# Patient Record
Sex: FEMALE | Race: OTHER | HISPANIC OR LATINO | URBAN - METROPOLITAN AREA
[De-identification: names, ages, dates, MRNs, and addresses within clinical notes are randomized per-mention and may not be internally consistent; named-entity substitution may affect disease eponyms.]

---

## 2019-06-05 ENCOUNTER — INPATIENT (INPATIENT)
Facility: HOSPITAL | Age: 54
LOS: 5 days | Discharge: HOME | DRG: 872 | End: 2019-06-11
Attending: INTERNAL MEDICINE | Admitting: INTERNAL MEDICINE
Payer: SELF-PAY

## 2019-06-05 VITALS
RESPIRATION RATE: 24 BRPM | TEMPERATURE: 98 F | HEART RATE: 86 BPM | OXYGEN SATURATION: 100 % | WEIGHT: 184.97 LBS | HEIGHT: 60 IN

## 2019-06-05 DIAGNOSIS — Z98.890 OTHER SPECIFIED POSTPROCEDURAL STATES: Chronic | ICD-10-CM

## 2019-06-05 DIAGNOSIS — R11.10 VOMITING, UNSPECIFIED: ICD-10-CM

## 2019-06-05 DIAGNOSIS — K52.9 NONINFECTIVE GASTROENTERITIS AND COLITIS, UNSPECIFIED: ICD-10-CM

## 2019-06-05 DIAGNOSIS — E87.2 ACIDOSIS: ICD-10-CM

## 2019-06-05 DIAGNOSIS — Z29.9 ENCOUNTER FOR PROPHYLACTIC MEASURES, UNSPECIFIED: ICD-10-CM

## 2019-06-05 LAB
ALBUMIN SERPL ELPH-MCNC: 3.8 G/DL — SIGNIFICANT CHANGE UP (ref 3.5–5)
ALP SERPL-CCNC: 121 U/L — HIGH (ref 40–120)
ALT FLD-CCNC: 27 U/L DA — SIGNIFICANT CHANGE UP (ref 10–60)
ANION GAP SERPL CALC-SCNC: 8 MMOL/L — SIGNIFICANT CHANGE UP (ref 5–17)
APPEARANCE UR: CLEAR — SIGNIFICANT CHANGE UP
APTT BLD: 31.7 SEC — SIGNIFICANT CHANGE UP (ref 27.5–36.3)
AST SERPL-CCNC: 21 U/L — SIGNIFICANT CHANGE UP (ref 10–40)
BASOPHILS # BLD AUTO: 0.06 K/UL — SIGNIFICANT CHANGE UP (ref 0–0.2)
BASOPHILS NFR BLD AUTO: 0.4 % — SIGNIFICANT CHANGE UP (ref 0–2)
BILIRUB SERPL-MCNC: 0.4 MG/DL — SIGNIFICANT CHANGE UP (ref 0.2–1.2)
BILIRUB UR-MCNC: NEGATIVE — SIGNIFICANT CHANGE UP
BUN SERPL-MCNC: 11 MG/DL — SIGNIFICANT CHANGE UP (ref 7–18)
CALCIUM SERPL-MCNC: 9.1 MG/DL — SIGNIFICANT CHANGE UP (ref 8.4–10.5)
CHLORIDE SERPL-SCNC: 108 MMOL/L — SIGNIFICANT CHANGE UP (ref 96–108)
CO2 SERPL-SCNC: 25 MMOL/L — SIGNIFICANT CHANGE UP (ref 22–31)
COLOR SPEC: YELLOW — SIGNIFICANT CHANGE UP
CREAT SERPL-MCNC: 0.66 MG/DL — SIGNIFICANT CHANGE UP (ref 0.5–1.3)
DIFF PNL FLD: NEGATIVE — SIGNIFICANT CHANGE UP
EOSINOPHIL # BLD AUTO: 0.01 K/UL — SIGNIFICANT CHANGE UP (ref 0–0.5)
EOSINOPHIL NFR BLD AUTO: 0.1 % — SIGNIFICANT CHANGE UP (ref 0–6)
GLUCOSE SERPL-MCNC: 133 MG/DL — HIGH (ref 70–99)
GLUCOSE UR QL: NEGATIVE — SIGNIFICANT CHANGE UP
HCG SERPL-ACNC: 8 MIU/ML — HIGH
HCT VFR BLD CALC: 40.7 % — SIGNIFICANT CHANGE UP (ref 34.5–45)
HGB BLD-MCNC: 13 G/DL — SIGNIFICANT CHANGE UP (ref 11.5–15.5)
IMM GRANULOCYTES NFR BLD AUTO: 0.5 % — SIGNIFICANT CHANGE UP (ref 0–1.5)
INR BLD: 1.01 RATIO — SIGNIFICANT CHANGE UP (ref 0.88–1.16)
KETONES UR-MCNC: ABNORMAL
LACTATE SERPL-SCNC: 2.1 MMOL/L — HIGH (ref 0.7–2)
LACTATE SERPL-SCNC: 2.3 MMOL/L — HIGH (ref 0.7–2)
LEUKOCYTE ESTERASE UR-ACNC: NEGATIVE — SIGNIFICANT CHANGE UP
LIDOCAIN IGE QN: 53 U/L — LOW (ref 73–393)
LYMPHOCYTES # BLD AUTO: 1.59 K/UL — SIGNIFICANT CHANGE UP (ref 1–3.3)
LYMPHOCYTES # BLD AUTO: 10.5 % — LOW (ref 13–44)
MCHC RBC-ENTMCNC: 28.8 PG — SIGNIFICANT CHANGE UP (ref 27–34)
MCHC RBC-ENTMCNC: 31.9 GM/DL — LOW (ref 32–36)
MCV RBC AUTO: 90.2 FL — SIGNIFICANT CHANGE UP (ref 80–100)
MONOCYTES # BLD AUTO: 0.38 K/UL — SIGNIFICANT CHANGE UP (ref 0–0.9)
MONOCYTES NFR BLD AUTO: 2.5 % — SIGNIFICANT CHANGE UP (ref 2–14)
NEUTROPHILS # BLD AUTO: 13.07 K/UL — HIGH (ref 1.8–7.4)
NEUTROPHILS NFR BLD AUTO: 86 % — HIGH (ref 43–77)
NITRITE UR-MCNC: NEGATIVE — SIGNIFICANT CHANGE UP
NRBC # BLD: 0 /100 WBCS — SIGNIFICANT CHANGE UP (ref 0–0)
PH UR: 8 — SIGNIFICANT CHANGE UP (ref 5–8)
PLATELET # BLD AUTO: 350 K/UL — SIGNIFICANT CHANGE UP (ref 150–400)
POTASSIUM SERPL-MCNC: 3.7 MMOL/L — SIGNIFICANT CHANGE UP (ref 3.5–5.3)
POTASSIUM SERPL-SCNC: 3.7 MMOL/L — SIGNIFICANT CHANGE UP (ref 3.5–5.3)
PROT SERPL-MCNC: 8.4 G/DL — HIGH (ref 6–8.3)
PROT UR-MCNC: NEGATIVE — SIGNIFICANT CHANGE UP
PROTHROM AB SERPL-ACNC: 11.2 SEC — SIGNIFICANT CHANGE UP (ref 10–12.9)
RBC # BLD: 4.51 M/UL — SIGNIFICANT CHANGE UP (ref 3.8–5.2)
RBC # FLD: 14.8 % — HIGH (ref 10.3–14.5)
SODIUM SERPL-SCNC: 141 MMOL/L — SIGNIFICANT CHANGE UP (ref 135–145)
SP GR SPEC: 1.01 — SIGNIFICANT CHANGE UP (ref 1.01–1.02)
TROPONIN I SERPL-MCNC: <0.015 NG/ML — SIGNIFICANT CHANGE UP (ref 0–0.04)
UROBILINOGEN FLD QL: NEGATIVE — SIGNIFICANT CHANGE UP
WBC # BLD: 15.19 K/UL — HIGH (ref 3.8–10.5)
WBC # FLD AUTO: 15.19 K/UL — HIGH (ref 3.8–10.5)

## 2019-06-05 PROCEDURE — 99285 EMERGENCY DEPT VISIT HI MDM: CPT

## 2019-06-05 PROCEDURE — 74177 CT ABD & PELVIS W/CONTRAST: CPT | Mod: 26

## 2019-06-05 RX ORDER — FAMOTIDINE 10 MG/ML
0 INJECTION INTRAVENOUS
Qty: 0 | Refills: 0 | DISCHARGE

## 2019-06-05 RX ORDER — METRONIDAZOLE 500 MG
500 TABLET ORAL ONCE
Refills: 0 | Status: COMPLETED | OUTPATIENT
Start: 2019-06-05 | End: 2019-06-05

## 2019-06-05 RX ORDER — CIPROFLOXACIN LACTATE 400MG/40ML
VIAL (ML) INTRAVENOUS
Refills: 0 | Status: DISCONTINUED | OUTPATIENT
Start: 2019-06-05 | End: 2019-06-11

## 2019-06-05 RX ORDER — OXYCODONE HYDROCHLORIDE 5 MG/1
1 TABLET ORAL
Qty: 0 | Refills: 0 | DISCHARGE

## 2019-06-05 RX ORDER — ENOXAPARIN SODIUM 100 MG/ML
40 INJECTION SUBCUTANEOUS DAILY
Refills: 0 | Status: DISCONTINUED | OUTPATIENT
Start: 2019-06-05 | End: 2019-06-11

## 2019-06-05 RX ORDER — CIPROFLOXACIN LACTATE 400MG/40ML
400 VIAL (ML) INTRAVENOUS ONCE
Refills: 0 | Status: COMPLETED | OUTPATIENT
Start: 2019-06-05 | End: 2019-06-05

## 2019-06-05 RX ORDER — GABAPENTIN 400 MG/1
2 CAPSULE ORAL
Qty: 0 | Refills: 0 | DISCHARGE

## 2019-06-05 RX ORDER — METRONIDAZOLE 500 MG
500 TABLET ORAL EVERY 8 HOURS
Refills: 0 | Status: DISCONTINUED | OUTPATIENT
Start: 2019-06-06 | End: 2019-06-11

## 2019-06-05 RX ORDER — KETOROLAC TROMETHAMINE 30 MG/ML
15 SYRINGE (ML) INJECTION ONCE
Refills: 0 | Status: DISCONTINUED | OUTPATIENT
Start: 2019-06-05 | End: 2019-06-06

## 2019-06-05 RX ORDER — METRONIDAZOLE 500 MG
TABLET ORAL
Refills: 0 | Status: DISCONTINUED | OUTPATIENT
Start: 2019-06-05 | End: 2019-06-11

## 2019-06-05 RX ORDER — PIPERACILLIN AND TAZOBACTAM 4; .5 G/20ML; G/20ML
3.38 INJECTION, POWDER, LYOPHILIZED, FOR SOLUTION INTRAVENOUS ONCE
Refills: 0 | Status: COMPLETED | OUTPATIENT
Start: 2019-06-05 | End: 2019-06-05

## 2019-06-05 RX ORDER — CIPROFLOXACIN LACTATE 400MG/40ML
400 VIAL (ML) INTRAVENOUS EVERY 12 HOURS
Refills: 0 | Status: DISCONTINUED | OUTPATIENT
Start: 2019-06-06 | End: 2019-06-11

## 2019-06-05 RX ORDER — SODIUM CHLORIDE 9 MG/ML
1000 INJECTION INTRAMUSCULAR; INTRAVENOUS; SUBCUTANEOUS ONCE
Refills: 0 | Status: COMPLETED | OUTPATIENT
Start: 2019-06-05 | End: 2019-06-05

## 2019-06-05 RX ORDER — MORPHINE SULFATE 50 MG/1
4 CAPSULE, EXTENDED RELEASE ORAL ONCE
Refills: 0 | Status: DISCONTINUED | OUTPATIENT
Start: 2019-06-05 | End: 2019-06-05

## 2019-06-05 RX ORDER — DULOXETINE HYDROCHLORIDE 30 MG/1
0 CAPSULE, DELAYED RELEASE ORAL
Qty: 0 | Refills: 0 | DISCHARGE

## 2019-06-05 RX ORDER — FAMOTIDINE 10 MG/ML
20 INJECTION INTRAVENOUS EVERY 12 HOURS
Refills: 0 | Status: DISCONTINUED | OUTPATIENT
Start: 2019-06-05 | End: 2019-06-10

## 2019-06-05 RX ORDER — KETOROLAC TROMETHAMINE 30 MG/ML
15 SYRINGE (ML) INJECTION ONCE
Refills: 0 | Status: DISCONTINUED | OUTPATIENT
Start: 2019-06-05 | End: 2019-06-05

## 2019-06-05 RX ORDER — SODIUM CHLORIDE 9 MG/ML
1000 INJECTION, SOLUTION INTRAVENOUS
Refills: 0 | Status: DISCONTINUED | OUTPATIENT
Start: 2019-06-05 | End: 2019-06-11

## 2019-06-05 RX ORDER — ONDANSETRON 8 MG/1
4 TABLET, FILM COATED ORAL ONCE
Refills: 0 | Status: COMPLETED | OUTPATIENT
Start: 2019-06-05 | End: 2019-06-05

## 2019-06-05 RX ORDER — ONDANSETRON 8 MG/1
4 TABLET, FILM COATED ORAL EVERY 6 HOURS
Refills: 0 | Status: DISCONTINUED | OUTPATIENT
Start: 2019-06-05 | End: 2019-06-11

## 2019-06-05 RX ADMIN — MORPHINE SULFATE 4 MILLIGRAM(S): 50 CAPSULE, EXTENDED RELEASE ORAL at 14:02

## 2019-06-05 RX ADMIN — Medication 200 MILLIGRAM(S): at 18:35

## 2019-06-05 RX ADMIN — Medication 100 MILLIGRAM(S): at 18:29

## 2019-06-05 RX ADMIN — ONDANSETRON 4 MILLIGRAM(S): 8 TABLET, FILM COATED ORAL at 13:24

## 2019-06-05 RX ADMIN — MORPHINE SULFATE 4 MILLIGRAM(S): 50 CAPSULE, EXTENDED RELEASE ORAL at 13:24

## 2019-06-05 RX ADMIN — MORPHINE SULFATE 4 MILLIGRAM(S): 50 CAPSULE, EXTENDED RELEASE ORAL at 19:50

## 2019-06-05 RX ADMIN — SODIUM CHLORIDE 100 MILLILITER(S): 9 INJECTION, SOLUTION INTRAVENOUS at 18:36

## 2019-06-05 RX ADMIN — MORPHINE SULFATE 4 MILLIGRAM(S): 50 CAPSULE, EXTENDED RELEASE ORAL at 16:00

## 2019-06-05 RX ADMIN — SODIUM CHLORIDE 1000 MILLILITER(S): 9 INJECTION INTRAMUSCULAR; INTRAVENOUS; SUBCUTANEOUS at 14:28

## 2019-06-05 RX ADMIN — PIPERACILLIN AND TAZOBACTAM 200 GRAM(S): 4; .5 INJECTION, POWDER, LYOPHILIZED, FOR SOLUTION INTRAVENOUS at 15:18

## 2019-06-05 RX ADMIN — SODIUM CHLORIDE 1000 MILLILITER(S): 9 INJECTION INTRAMUSCULAR; INTRAVENOUS; SUBCUTANEOUS at 13:24

## 2019-06-05 NOTE — H&P ADULT - HISTORY OF PRESENT ILLNESS
Ms. Glo Reilly is a 54 year old Moroccan-speaking female with no PMHx presenting to the ED with chief complaint of vomiting. She reports that this morning she experiencing numerous episodes of NBNB vomiting with nausea and one episode of diarrhea. She reports associated symptoms of diffuse abdominal pain, as well as subjective fever and chills. She denies chest pain, dizziness, or any other symptoms at this time. She denies any recent travel or sick contacts. She denies eating any unusual food lately. She has no other complaints at this time.    In the ED, patient received zosyn x1, 1L NS bolus and a total of 8mg of morphine. Ms. Glo Reilly is a 54 year old Japanese-speaking female with no PMHx presenting to the ED with chief complaint of vomiting. She reports that this morning she experiencing numerous episodes of NBNB vomiting with nausea and one episode of diarrhea. She reports associated symptoms of diffuse abdominal pain, as well as subjective fever and chills. She denies chest pain, dizziness, or any other symptoms at this time. She denies any recent travel or sick contacts. She denies eating any unusual food lately. She has no other complaints at this time.    In the ED, patient received zosyn x1, 1L NS bolus and a total of 8mg of morphine Ms. Glo Reilly is a 54 year old Anguillan-speaking female with no PMHx presenting to the ED with chief complaint of vomiting. She reports that this morning she experiencing numerous episodes of NBNB vomiting with nausea and one episode of diarrhea. She reports associated symptoms of diffuse abdominal pain, as well as subjective fever and chills. She denies chest pain, dizziness, or any other symptoms at this time. She denies any recent travel or sick contacts. She denies eating any unusual food lately. She has no other complaints at this time.    Patient is from home and generally independent, but now ambulates with walker s/p traumatic work related injury 1 month ago and subsequent spinal surgery.    In the ED, patient received zosyn x1, 1L NS bolus and a total of 8mg of morphine.

## 2019-06-05 NOTE — H&P ADULT - NSHPSOCIALHISTORY_GEN_ALL_CORE
Patient denies use of tobacco products or illicit drugs. She reports occasional social alcohol use, but denies any recent drinks. 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

## 2019-06-05 NOTE — H&P ADULT - ATTENDING COMMENTS
pt seen and evaluated  agree with note above  pt a/w sepsis 2/2 colitis  cipro/flagyl  f/u stool studies  gi and id consult

## 2019-06-05 NOTE — H&P ADULT - NSHPPHYSICALEXAM_GEN_ALL_CORE
Vital Signs Last 24 Hrs  T(C): 37.4 (05 Jun 2019 16:05), Max: 37.4 (05 Jun 2019 16:05)  T(F): 99.3 (05 Jun 2019 16:05), Max: 99.3 (05 Jun 2019 16:05)  HR: 67 (05 Jun 2019 16:05) (67 - 86)  BP: 126/73 (05 Jun 2019 16:05) (126/73 - 126/73)  BP(mean): --  RR: 24 (05 Jun 2019 16:05) (24 - 24)  SpO2: 100% (05 Jun 2019 16:05) (100% - 100%)

## 2019-06-05 NOTE — ED ADULT NURSE NOTE - OBJECTIVE STATEMENT
Pt brought in by wheelchair c/o abdominal pain N&V started this AM. Pt c/o subjective fever with chills.

## 2019-06-05 NOTE — H&P ADULT - PROBLEM SELECTOR PLAN 1
WBC count 15.19  CT abdomen suggestive of colitis  lipase wnl  c/w cipro  c/w flagyl  NPO for now  c/w IV fluids  zofran prn  pepcid 20mg iv bid while npo  GI consulted - Dr. Costello  ID consulted - Dr. Sewell WBC count 15.19  CT abdomen suggestive of colitis  sx most likely 2/2 to infectious colitis  lipase wnl  c/w cipro  c/w flagyl  NPO for now - diet to be advanced as tolerated  c/w IV fluids  zofran prn  pepcid 20mg iv bid while npo  GI consulted - Dr. Costello  ID consulted - Dr. Sewell

## 2019-06-05 NOTE — ED PROVIDER NOTE - PROGRESS NOTE DETAILS
Pt's serum beta hCG came back at 8--I discussed with Pt and she says that she is perimenopausal and absolutely does not suspect that she is pregnant.  She had not had menstrual period in 2 years, and then in March 2019 she had vaginal bleeding for only 2 days and then no menstrual period since then.  I do not suspect that she is pregnant and benefit of having CT A/P likely outweighs risk based on information known at this time, especially given that she has severe abdominal pain with elevated WBC and lactate.  Zosyn ordered empirically for suspected intra-abdominal infection. Mcgarry: s/o from dr saenz.  pt ct shows diffuse colitis, abd , unable to po.    mild wbc  admit to med for diffuse colitis.  continue with cipro/flagyl, rpt lactate ordered.  pt received zosyn in ed

## 2019-06-05 NOTE — H&P ADULT - ASSESSMENT
Patient is a 54 year old female admitted to medicine for intractable vomiting 2/2 to infectious colitis.

## 2019-06-05 NOTE — ED PROVIDER NOTE - CLINICAL SUMMARY MEDICAL DECISION MAKING FREE TEXT BOX
53 y/o F pt presents with abdominal pain, vomiting, diarrhea and fever. Will check labs, UA, CT abd/pelvis, provide medication for nausea and reassess.

## 2019-06-05 NOTE — ED PROVIDER NOTE - OBJECTIVE STATEMENT
55 y/o F pt with a PMHx of kidney stones and a PSHx of lumbar spinal surgery with anterior and posterior approach last month, presents to the ED with complaints of generalized abdominal pain, vomiting, diarrhea and subjective fever since this morning. Contrary to triage note, denies rash. Patient denies urinary symptoms or any other symptoms. Allergies: Aspirin

## 2019-06-06 DIAGNOSIS — K52.9 NONINFECTIVE GASTROENTERITIS AND COLITIS, UNSPECIFIED: ICD-10-CM

## 2019-06-06 LAB
24R-OH-CALCIDIOL SERPL-MCNC: 13.1 NG/ML — LOW (ref 30–80)
ALBUMIN SERPL ELPH-MCNC: 3.2 G/DL — LOW (ref 3.5–5)
ALP SERPL-CCNC: 94 U/L — SIGNIFICANT CHANGE UP (ref 40–120)
ALT FLD-CCNC: 23 U/L DA — SIGNIFICANT CHANGE UP (ref 10–60)
ANION GAP SERPL CALC-SCNC: 6 MMOL/L — SIGNIFICANT CHANGE UP (ref 5–17)
AST SERPL-CCNC: 17 U/L — SIGNIFICANT CHANGE UP (ref 10–40)
BASOPHILS # BLD AUTO: 0.04 K/UL — SIGNIFICANT CHANGE UP (ref 0–0.2)
BASOPHILS NFR BLD AUTO: 0.3 % — SIGNIFICANT CHANGE UP (ref 0–2)
BILIRUB SERPL-MCNC: 0.5 MG/DL — SIGNIFICANT CHANGE UP (ref 0.2–1.2)
BUN SERPL-MCNC: 10 MG/DL — SIGNIFICANT CHANGE UP (ref 7–18)
CALCIUM SERPL-MCNC: 8.2 MG/DL — LOW (ref 8.4–10.5)
CHLORIDE SERPL-SCNC: 112 MMOL/L — HIGH (ref 96–108)
CHOLEST SERPL-MCNC: 153 MG/DL — SIGNIFICANT CHANGE UP (ref 10–199)
CO2 SERPL-SCNC: 27 MMOL/L — SIGNIFICANT CHANGE UP (ref 22–31)
CREAT SERPL-MCNC: 0.69 MG/DL — SIGNIFICANT CHANGE UP (ref 0.5–1.3)
EOSINOPHIL # BLD AUTO: 0.1 K/UL — SIGNIFICANT CHANGE UP (ref 0–0.5)
EOSINOPHIL NFR BLD AUTO: 0.8 % — SIGNIFICANT CHANGE UP (ref 0–6)
FOLATE SERPL-MCNC: 8.1 NG/ML — SIGNIFICANT CHANGE UP
GLUCOSE SERPL-MCNC: 94 MG/DL — SIGNIFICANT CHANGE UP (ref 70–99)
HBA1C BLD-MCNC: 5.1 % — SIGNIFICANT CHANGE UP (ref 4–5.6)
HCT VFR BLD CALC: 37 % — SIGNIFICANT CHANGE UP (ref 34.5–45)
HDLC SERPL-MCNC: 52 MG/DL — SIGNIFICANT CHANGE UP
HGB BLD-MCNC: 12 G/DL — SIGNIFICANT CHANGE UP (ref 11.5–15.5)
IMM GRANULOCYTES NFR BLD AUTO: 0.4 % — SIGNIFICANT CHANGE UP (ref 0–1.5)
LACTATE SERPL-SCNC: 1.2 MMOL/L — SIGNIFICANT CHANGE UP (ref 0.7–2)
LIPID PNL WITH DIRECT LDL SERPL: 84 MG/DL — SIGNIFICANT CHANGE UP
LYMPHOCYTES # BLD AUTO: 2.94 K/UL — SIGNIFICANT CHANGE UP (ref 1–3.3)
LYMPHOCYTES # BLD AUTO: 22.8 % — SIGNIFICANT CHANGE UP (ref 13–44)
MAGNESIUM SERPL-MCNC: 1.9 MG/DL — SIGNIFICANT CHANGE UP (ref 1.6–2.6)
MCHC RBC-ENTMCNC: 28.6 PG — SIGNIFICANT CHANGE UP (ref 27–34)
MCHC RBC-ENTMCNC: 32.4 GM/DL — SIGNIFICANT CHANGE UP (ref 32–36)
MCV RBC AUTO: 88.3 FL — SIGNIFICANT CHANGE UP (ref 80–100)
MONOCYTES # BLD AUTO: 0.96 K/UL — HIGH (ref 0–0.9)
MONOCYTES NFR BLD AUTO: 7.4 % — SIGNIFICANT CHANGE UP (ref 2–14)
NEUTROPHILS # BLD AUTO: 8.82 K/UL — HIGH (ref 1.8–7.4)
NEUTROPHILS NFR BLD AUTO: 68.3 % — SIGNIFICANT CHANGE UP (ref 43–77)
NRBC # BLD: 0 /100 WBCS — SIGNIFICANT CHANGE UP (ref 0–0)
PHOSPHATE SERPL-MCNC: 2.7 MG/DL — SIGNIFICANT CHANGE UP (ref 2.5–4.5)
PLATELET # BLD AUTO: 317 K/UL — SIGNIFICANT CHANGE UP (ref 150–400)
POTASSIUM SERPL-MCNC: 3.1 MMOL/L — LOW (ref 3.5–5.3)
POTASSIUM SERPL-SCNC: 3.1 MMOL/L — LOW (ref 3.5–5.3)
PROT SERPL-MCNC: 7.1 G/DL — SIGNIFICANT CHANGE UP (ref 6–8.3)
RBC # BLD: 4.19 M/UL — SIGNIFICANT CHANGE UP (ref 3.8–5.2)
RBC # FLD: 14.7 % — HIGH (ref 10.3–14.5)
SODIUM SERPL-SCNC: 145 MMOL/L — SIGNIFICANT CHANGE UP (ref 135–145)
TOTAL CHOLESTEROL/HDL RATIO MEASUREMENT: 2.9 RATIO — LOW (ref 3.3–7.1)
TRIGL SERPL-MCNC: 87 MG/DL — SIGNIFICANT CHANGE UP (ref 10–149)
TSH SERPL-MCNC: 0.68 UU/ML — SIGNIFICANT CHANGE UP (ref 0.34–4.82)
VIT B12 SERPL-MCNC: 419 PG/ML — SIGNIFICANT CHANGE UP (ref 232–1245)
WBC # BLD: 12.91 K/UL — HIGH (ref 3.8–10.5)
WBC # FLD AUTO: 12.91 K/UL — HIGH (ref 3.8–10.5)

## 2019-06-06 RX ORDER — MORPHINE SULFATE 50 MG/1
4 CAPSULE, EXTENDED RELEASE ORAL ONCE
Refills: 0 | Status: DISCONTINUED | OUTPATIENT
Start: 2019-06-06 | End: 2019-06-06

## 2019-06-06 RX ORDER — MORPHINE SULFATE 50 MG/1
2 CAPSULE, EXTENDED RELEASE ORAL EVERY 6 HOURS
Refills: 0 | Status: DISCONTINUED | OUTPATIENT
Start: 2019-06-06 | End: 2019-06-11

## 2019-06-06 RX ORDER — POTASSIUM CHLORIDE 20 MEQ
40 PACKET (EA) ORAL ONCE
Refills: 0 | Status: COMPLETED | OUTPATIENT
Start: 2019-06-06 | End: 2019-06-06

## 2019-06-06 RX ORDER — ACETAMINOPHEN 500 MG
650 TABLET ORAL EVERY 6 HOURS
Refills: 0 | Status: DISCONTINUED | OUTPATIENT
Start: 2019-06-06 | End: 2019-06-11

## 2019-06-06 RX ADMIN — Medication 15 MILLIGRAM(S): at 00:09

## 2019-06-06 RX ADMIN — ENOXAPARIN SODIUM 40 MILLIGRAM(S): 100 INJECTION SUBCUTANEOUS at 11:59

## 2019-06-06 RX ADMIN — Medication 200 MILLIGRAM(S): at 05:53

## 2019-06-06 RX ADMIN — Medication 200 MILLIGRAM(S): at 17:39

## 2019-06-06 RX ADMIN — FAMOTIDINE 20 MILLIGRAM(S): 10 INJECTION INTRAVENOUS at 05:53

## 2019-06-06 RX ADMIN — SODIUM CHLORIDE 100 MILLILITER(S): 9 INJECTION, SOLUTION INTRAVENOUS at 13:19

## 2019-06-06 RX ADMIN — Medication 100 MILLIGRAM(S): at 05:53

## 2019-06-06 RX ADMIN — MORPHINE SULFATE 2 MILLIGRAM(S): 50 CAPSULE, EXTENDED RELEASE ORAL at 22:01

## 2019-06-06 RX ADMIN — FAMOTIDINE 20 MILLIGRAM(S): 10 INJECTION INTRAVENOUS at 18:37

## 2019-06-06 RX ADMIN — MORPHINE SULFATE 2 MILLIGRAM(S): 50 CAPSULE, EXTENDED RELEASE ORAL at 15:50

## 2019-06-06 RX ADMIN — MORPHINE SULFATE 4 MILLIGRAM(S): 50 CAPSULE, EXTENDED RELEASE ORAL at 09:59

## 2019-06-06 RX ADMIN — Medication 100 MILLIGRAM(S): at 13:06

## 2019-06-06 RX ADMIN — MORPHINE SULFATE 2 MILLIGRAM(S): 50 CAPSULE, EXTENDED RELEASE ORAL at 21:31

## 2019-06-06 RX ADMIN — Medication 15 MILLIGRAM(S): at 00:39

## 2019-06-06 RX ADMIN — Medication 40 MILLIEQUIVALENT(S): at 13:05

## 2019-06-06 RX ADMIN — Medication 100 MILLIGRAM(S): at 21:32

## 2019-06-06 RX ADMIN — MORPHINE SULFATE 2 MILLIGRAM(S): 50 CAPSULE, EXTENDED RELEASE ORAL at 16:45

## 2019-06-06 RX ADMIN — MORPHINE SULFATE 4 MILLIGRAM(S): 50 CAPSULE, EXTENDED RELEASE ORAL at 10:37

## 2019-06-06 NOTE — PHYSICAL THERAPY INITIAL EVALUATION ADULT - ACTIVE RANGE OF MOTION EXAMINATION, REHAB EVAL
bilateral upper extremity Active ROM was WFL (within functional limits)/Left LE Active ROM was WFL (within functional limits)/decreased AROM of RLE secondary to accident and surgery

## 2019-06-06 NOTE — CONSULT NOTE ADULT - CONSTITUTIONAL DETAILS
well-nourished/well-developed/well-groomed/no distress
well-developed/well-groomed/no distress/well-nourished

## 2019-06-06 NOTE — PROGRESS NOTE ADULT - ASSESSMENT
Patient is a 54 year old female admitted to medicine for intractable vomiting 2/2 to infectious colitis.           Problem/Plan - 2:  ·  Problem: Lactic acidosis.  Plan: lactate 2.1 on admission --> increased to 2.3  c/w IV fluids  f/u lactate in AM.      Problem/Plan - 3:  ·  Problem: Need for prophylactic measure.  Plan: lovenox 40mg subq for dvt ppx.        Attending Statement:  pt seen and evaluated  agree with note above  pt a/w sepsis 2/2 colitis  cipro/flagyl  f/u stool studies  gi and id consult.    Pancolitis most likely infectious    Suggestions:    1. Check stool for cultures, O&P and C.diff toxin  2. Monitor electrolytes  3. Clear liquid diet  4. Avoid NSAID  5. Antibiotics IV  6. DVT prophylaxis Patient is a 54 year old female admitted to medicine for intractable vomiting 2/2 to infectious colitis.

## 2019-06-06 NOTE — CONSULT NOTE ADULT - NEGATIVE ENMT SYMPTOMS
no nose bleeds/no throat pain/no hearing difficulty/no ear pain/no dry mouth/no dysphagia/no gum bleeding/no vertigo

## 2019-06-06 NOTE — PHYSICAL THERAPY INITIAL EVALUATION ADULT - GENERAL OBSERVATIONS, REHAB EVAL
Pt received supine in bed, c/o pain in abdomen and feeling nauseous, however cooperative and agreeable to PT

## 2019-06-06 NOTE — CONSULT NOTE ADULT - ASSESSMENT
Colitis  Leukocytosis    plan - cont cipro 400mgs iv q12hrs  cont flagyl 500mgs iv q8hrs
1. Pancolitis most likely infectious    Suggestions:    1. Check stool for cultures, O&P and C.diff toxin  2. Monitor electrolytes  3. Clear liquid diet  4. Avoid NSAID  5. Antibiotics IV  6. DVT prophylaxis

## 2019-06-06 NOTE — CONSULT NOTE ADULT - NEGATIVE OPHTHALMOLOGIC SYMPTOMS
Soila Amezquita  [address] [facility] [id number]    1/6/2020      Dear  Soila Amezquita    In order to provide the highest quality care, MELO Rosario uses a sophisticated computer system to track our patien no photophobia/no diplopia

## 2019-06-06 NOTE — PHYSICAL THERAPY INITIAL EVALUATION ADULT - DIAGNOSIS, PT EVAL
Pt presents with weakness and impairments in functional mobility secondary to work accident from April 17th and subsequent surgeries. Pt is also experiencing abdominal pain and nausea which is affecting her mobility at this time.

## 2019-06-06 NOTE — CONSULT NOTE ADULT - GASTROINTESTINAL DETAILS
soft/no rebound tenderness/no rigidity/no masses palpable/no organomegaly/no guarding/bowel sounds normal/no distention
no rigidity/bowel sounds normal/nontender/no guarding/no rebound tenderness/soft/no distention

## 2019-06-06 NOTE — PHYSICAL THERAPY INITIAL EVALUATION ADULT - PHYSICAL ASSIST/NONPHYSICAL ASSIST: STAND/SIT, REHAB EVAL
"Initial /70   Pulse 73   Temp 97.7  F (36.5  C) (Tympanic)   Resp 16   Ht 1.626 m (5' 4\")   Wt 81 kg (178 lb 9.6 oz)   SpO2 100%   BMI 30.66 kg/m   Estimated body mass index is 30.66 kg/m  as calculated from the following:    Height as of this encounter: 1.626 m (5' 4\").    Weight as of this encounter: 81 kg (178 lb 9.6 oz). .    " 1 person assist

## 2019-06-06 NOTE — CONSULT NOTE ADULT - CONSTITUTIONAL
Bedside shift change report given to Irma Ortiz RN (oncoming nurse) by Jayden Fernandes RN (offgoing nurse). Report included the following information SBAR, Kardex, Intake/Output, MAR, Recent Results and Cardiac Rhythm NSR.
detailed exam
detailed exam

## 2019-06-06 NOTE — CONSULT NOTE ADULT - NEGATIVE NEUROLOGICAL SYMPTOMS
no confusion/no syncope/no headache/no tremors/no vertigo/no loss of consciousness/no loss of sensation/no difficulty walking

## 2019-06-06 NOTE — CONSULT NOTE ADULT - SUBJECTIVE AND OBJECTIVE BOX
[  ] STAT REQUEST              [ X ] ROUTINE REQUEST    Patient is a 54 year old female with abdominal pain and vomiting. GI consulted to evaluate.         HPI:  Ms. Glo Reilly is a 54 year old female presented wit diffuse abdominal pain, vomiting and diarrhea associated with fever and chills.  Patient denies hematemesis, hematochezia, melena, SOB, chest pain, palpitation, cough, hematuria, dysuria, recent traveling or antibiotic use.             PAIN MANAGEMENT:  Pain Scale:                7-8 /10  Pain Location:  Diffuse abdominal pain    Prior Colonoscopy:  No prior colonoscopy    PAST MEDICAL HISTORY  Kidney stones      PAST SURGICAL HISTORY  Spinal surgery      Allergies    aspirin (Rash)          MEDICATIONS  (STANDING):  ciprofloxacin   IVPB 400 milliGRAM(s) IV Intermittent every 12 hours  ciprofloxacin   IVPB      enoxaparin Injectable 40 milliGRAM(s) SubCutaneous daily  famotidine Injectable 20 milliGRAM(s) IV Push every 12 hours  lactated ringers. 1000 milliLiter(s) (100 mL/Hr) IV Continuous <Continuous>  metroNIDAZOLE  IVPB      metroNIDAZOLE  IVPB 500 milliGRAM(s) IV Intermittent every 8 hours    MEDICATIONS  (PRN):  morphine  - Injectable 2 milliGRAM(s) IV Push every 6 hours PRN Severe Pain (7 - 10)  ondansetron Injectable 4 milliGRAM(s) IV Push every 6 hours PRN Nausea and/or Vomiting      SOCIAL HISTORY  Advanced Directives:       [ X ] Full Code       [  ] DNR  Marital Status:         [ X ] M      [  ] S      [  ] D       [  ] W  Children:       [X  ] Yes      [  ] No  Occupation:        [  ] Employed       [ X ] Unemployed       [  ] Retired  Diet:       [ X ] Regular       [  ] PEG feeding          [  ] NG tube feeding  Drug Use:           [ X ] Patient denied          [  ] Yes  Alcohol:           [ X ] No             [  ] Yes (socially)         [  ] Yes (chronic)  Tobacco:           [  ] Yes           [ X ] No        FAMILY HISTORY  [ X ] Heart Disease            [  ] Diabetes             [  ] HTN             [  ] Colon Cancer             [  ] Stomach Cancer              [  ] Pancreatic Cancer        VITAL SIGNS   Vital Signs Last 24 Hrs  T(C): 37.1 (06 Jun 2019 14:12), Max: 37.4 (05 Jun 2019 16:05)  T(F): 98.8 (06 Jun 2019 14:12), Max: 99.3 (05 Jun 2019 16:05)  HR: 68 (06 Jun 2019 14:12) (67 - 91)  BP: 111/62 (06 Jun 2019 14:12) (108/56 - 137/96)  BP(mean): 69 (05 Jun 2019 19:00) (69 - 69)  RR: 16 (06 Jun 2019 14:12) (16 - 24)  SpO2: 100% (06 Jun 2019 14:12) (98% - 100%)          CBC Full  -  ( 06 Jun 2019 07:29 )  WBC Count : 12.91 K/uL  RBC Count : 4.19 M/uL  Hemoglobin : 12.0 g/dL  Hematocrit : 37.0 %  Platelet Count - Automated : 317 K/uL  Mean Cell Volume : 88.3 fl  Mean Cell Hemoglobin : 28.6 pg  Mean Cell Hemoglobin Concentration : 32.4 gm/dL  Auto Neutrophil # : 8.82 K/uL  Auto Lymphocyte # : 2.94 K/uL  Auto Monocyte # : 0.96 K/uL  Auto Eosinophil # : 0.10 K/uL  Auto Basophil # : 0.04 K/uL  Auto Neutrophil % : 68.3 %  Auto Lymphocyte % : 22.8 %  Auto Monocyte % : 7.4 %  Auto Eosinophil % : 0.8 %  Auto Basophil % : 0.3 %      06-06    145  |  112<H>  |  10  ----------------------------<  94  3.1<L>   |  27  |  0.69    Ca    8.2<L>      06 Jun 2019 07:29  Phos  2.7     06-06  Mg     1.9     06-06    TPro  7.1  /  Alb  3.2<L>  /  TBili  0.5  /  DBili  x   /  AST  17  /  ALT  23  /  AlkPhos  94  06-06     PT/INR - ( 05 Jun 2019 13:45 )   PT: 11.2 sec;   INR: 1.01 ratio     PTT - ( 05 Jun 2019 13:45 )  PTT:31.7 sec      Urinalysis (06.05.19 @ 13:45)    pH Urine: 8.0    Blood, Urine: Negative    Glucose Qualitative, Urine: Negative    Color: Yellow    Urine Appearance: Clear    Bilirubin: Negative    Ketone - Urine: Moderate    Specific Gravity: 1.010    Protein, Urine: Negative    Urobilinogen: Negative    Nitrite: Negative    Leukocyte Esterase Concentration: Negative    ECG  Ventricular Rate 77 BPM    Atrial Rate 77 BPM    P-R Interval 148 ms    QRS Duration 82 ms     ms    QTc 428 ms    P Axis 36 degrees    R Axis 43 degrees    T Axis 37 degrees    Diagnosis Line Normal sinus rhythm  Normal ECG    RADIOLOGY/IMAGING       EXAM:  CT ABDOMEN AND PELVIS IC                            PROCEDURE DATE:  06/05/2019          INTERPRETATION:  CLINICAL INDICATION: 54 years  Female with diffuse abd   pain, vomiting, fever, diarrhea. Lumbosacral spine surgery one month ago.    COMPARISON: None.    PROCEDURE:   CT of the Abdomen and Pelvis was performed with intravenous contrast.   Intravenous contrast: 90 ml Omnipaque 350. 10 ml discarded.  Oral contrast: None.  Sagittal and coronal reformats were performed.    LIMITATION: Lack of enteric contrast limits evaluation of the bowel.   Motion artifact.    FINDINGS:    LOWER CHEST: Bilateral lower lobe dependent atelectasis. Mildly enlarged   right hilar lymph node measuring up to 7 mm in short axis.    LIVER: The liver is lowin attenuation secondary to hepatic steatosis.   There is no focal mass or intrahepatic biliary duct distention. The liver   is enlarged with the right lobe measuring 22.5 cm sagittal.  BILE DUCTS: Normal caliber.  GALLBLADDER: Within normal limits.  SPLEEN: Within normal limits.  PANCREAS: Within normal limits.  ADRENALS: Within normal limits.  KIDNEYS/URETERS: 6 mm right midpole renal hypodensity too small to   characterize with certainty. No other renal mass. No hydronephrosis or   calculus. The kidneys enhance symmetrically. The ureters are not   distended.    BLADDER: Within normal limits.  REPRODUCTIVE ORGANS: The uterus contains a 3 mm calcified intramural   myoma. Otherwise unremarkable.    BOWEL: Diffuse colonic wall thickening. No pericolonic inflammatory   stranding. The appendix is normal.  PERITONEUM: No ascites.  VESSELS:  Within normal limits.  RETROPERITONEUM: No lymphadenopathy.    ABDOMINAL WALL: Within normal limits.  BONES: Status post L3, L4 and L5 laminectomies with bilateral pedicle   screws and intervening rods. Anterior L5-S1 fixation. Soft tissue is   present within the operative bed. Cannot evaluate the spinal canal and   exit foramina due to limitations of CT and adjacent metallic hardware   artifact.     IMPRESSION:     Diffuse colonic wall thickening consistent with under distention or   colitis.    Status post L3, L4 and L5 laminectomies with bilateral pedicle screws and   intervening rods. Anterior L5-S1 fixation. Soft tissue is present within   theoperative bed consistent with postsurgical change. Cannot rule out   superimposed infection. Cannot evaluate the spinal canal and exit   foramina due to limitations of CT and adjacent metallic hardware   artifact. Recommend clinical evaluation and follow-up.    Hepatic steatosis and hepatomegaly.    Renal hypodensity too small to characterize with certainty.    Mildly enlarged right hilar lymph node.

## 2019-06-06 NOTE — PHYSICAL THERAPY INITIAL EVALUATION ADULT - GAIT DEVIATIONS NOTED, PT EVAL
usestrailing limb position and lets trunk propel RLE forward d/t decreased strength and function of RLE

## 2019-06-06 NOTE — PHYSICAL THERAPY INITIAL EVALUATION ADULT - PERTINENT HX OF CURRENT PROBLEM, REHAB EVAL
Pt admitted with intractable vomitting, pt with recent traumatic work accident and surgery that affects her functional mobility

## 2019-06-06 NOTE — CONSULT NOTE ADULT - RS GEN PE MLT RESP DETAILS PC
no chest wall tenderness/clear to auscultation bilaterally/airway patent/respirations non-labored/breath sounds equal/good air movement

## 2019-06-06 NOTE — PHYSICAL THERAPY INITIAL EVALUATION ADULT - PATIENT PROFILE REVIEW, REHAB EVAL
yes/EMR, Laboratory and Radiology results reviewed
EMR, Laboratory and Radiology results reviewed/yes

## 2019-06-06 NOTE — PHYSICAL THERAPY INITIAL EVALUATION ADULT - ADDITIONAL COMMENTS
Pt independent with RW and modified AFO on RLE. Pts son lives with her and helps with all household chores and activities.

## 2019-06-06 NOTE — CONSULT NOTE ADULT - SUBJECTIVE AND OBJECTIVE BOX
HPI:  Ms. Glo Reilly is a 54 year old Bruneian-speaking female with no PMHx presenting to the ED with chief complaint of vomiting. She reports that this morning she experiencing numerous episodes of NBNB vomiting with nausea and one episode of diarrhea. She reports associated symptoms of diffuse abdominal pain, as well as subjective fever and chills. She denies chest pain, dizziness, or any other symptoms at this time. She denies any recent travel or sick contacts. She denies eating any unusual food lately. She has no other complaints at this time.  Patient is from home and generally independent, but now ambulates with walker s/p traumatic work related injury 1 month ago and subsequent spinal surgery.      PAST MEDICAL & SURGICAL HISTORY:  No pertinent past medical history  History of back surgery: s/p work related accident      aspirin (Rash)      Meds:  acetaminophen   Tablet .. 650 milliGRAM(s) Oral every 6 hours PRN  ciprofloxacin   IVPB 400 milliGRAM(s) IV Intermittent every 12 hours  ciprofloxacin   IVPB      enoxaparin Injectable 40 milliGRAM(s) SubCutaneous daily  famotidine Injectable 20 milliGRAM(s) IV Push every 12 hours  lactated ringers. 1000 milliLiter(s) IV Continuous <Continuous>  metroNIDAZOLE  IVPB      metroNIDAZOLE  IVPB 500 milliGRAM(s) IV Intermittent every 8 hours  morphine  - Injectable 2 milliGRAM(s) IV Push every 6 hours PRN  ondansetron Injectable 4 milliGRAM(s) IV Push every 6 hours PRN      SOCIAL HISTORY:  Smoker:   ETOH use:      FAMILY HISTORY:  No pertinent family history in first degree relatives      VITALS:  Vital Signs Last 24 Hrs  T(C): 37.1 (2019 14:12), Max: 37.2 (2019 21:29)  T(F): 98.8 (2019 14:12), Max: 99 (2019 21:29)  HR: 68 (2019 14:12) (68 - 91)  BP: 111/62 (2019 14:12) (108/56 - 137/96)  BP(mean): 69 (2019 19:00) (69 - 69)  RR: 16 (2019 14:12) (16 - 19)  SpO2: 100% (2019 14:12) (98% - 100%)    LABS/DIAGNOSTIC TESTS:                          12.0   12.91 )-----------( 317      ( 2019 07:29 )             37.0     WBC Count: 12.91 K/uL ( @ 07:29)  WBC Count: 15.19 K/uL ( @ 13:45)          145  |  112<H>  |  10  ----------------------------<  94  3.1<L>   |  27  |  0.69    Ca    8.2<L>      2019 07:29  Phos  2.7       Mg     1.9         TPro  7.1  /  Alb  3.2<L>  /  TBili  0.5  /  DBili  x   /  AST  17  /  ALT  23  /  AlkPhos  94        Urinalysis Basic - ( 2019 13:45 )    Color: Yellow / Appearance: Clear / S.010 / pH: x  Gluc: x / Ketone: Moderate  / Bili: Negative / Urobili: Negative   Blood: x / Protein: Negative / Nitrite: Negative   Leuk Esterase: Negative / RBC: x / WBC x   Sq Epi: x / Non Sq Epi: x / Bacteria: x        LIVER FUNCTIONS - ( 2019 07:29 )  Alb: 3.2 g/dL / Pro: 7.1 g/dL / ALK PHOS: 94 U/L / ALT: 23 U/L DA / AST: 17 U/L / GGT: x             PT/INR - ( 2019 13:45 )   PT: 11.2 sec;   INR: 1.01 ratio         PTT - ( 2019 13:45 )  PTT:31.7 sec    LACTATE:Lactate, Blood: 1.2 mmol/L ( @ 07:28)      ABG -     CULTURES:       RADIOLOGY:< from: CT Abdomen and Pelvis w/ IV Cont (19 @ 16:30) >  EXAM:  CT ABDOMEN AND PELVIS IC                            PROCEDURE DATE:  2019          INTERPRETATION:  CLINICAL INDICATION: 54 years  Female with diffuse abd   pain, vomiting, fever, diarrhea. Lumbosacral spine surgery one month ago.    COMPARISON: None.    PROCEDURE:   CT of the Abdomen and Pelvis was performed with intravenous contrast.   Intravenous contrast: 90 ml Omnipaque 350. 10 ml discarded.  Oral contrast: None.  Sagittal and coronal reformats were performed.    LIMITATION: Lack of enteric contrast limits evaluation of the bowel.   Motion artifact.    FINDINGS:    LOWER CHEST: Bilateral lower lobe dependent atelectasis. Mildly enlarged   right hilar lymph node measuring up to 7 mm in short axis.    LIVER: The liver is lowin attenuation secondary to hepatic steatosis.   There is no focal mass or intrahepatic biliary duct distention. The liver   is enlarged with the right lobe measuring 22.5 cm sagittal.  BILE DUCTS: Normal caliber.  GALLBLADDER: Within normal limits.  SPLEEN: Within normal limits.  PANCREAS: Within normal limits.  ADRENALS: Within normal limits.  KIDNEYS/URETERS: 6 mm right midpole renal hypodensity too small to   characterize with certainty. No other renal mass. No hydronephrosis or   calculus. The kidneys enhance symmetrically. The ureters are not   distended.    BLADDER: Within normal limits.  REPRODUCTIVE ORGANS: The uterus contains a 3 mm calcified intramural   myoma. Otherwise unremarkable.    BOWEL: Diffuse colonic wall thickening. No pericolonic inflammatory   stranding. The appendix is normal.  PERITONEUM: No ascites.  VESSELS:  Within normal limits.  RETROPERITONEUM: No lymphadenopathy.    ABDOMINAL WALL: Within normal limits.  BONES: Status post L3, L4 and L5 laminectomies with bilateral pedicle   screws and intervening rods. Anterior L5-S1 fixation. Soft tissue is   present within the operative bed. Cannot evaluate the spinal canal and   exit foramina due to limitations of CT and adjacent metallic hardware   artifact.     IMPRESSION:     Diffuse colonic wall thickening consistent with under distention or   colitis.    Status post L3, L4 and L5 laminectomies with bilateral pedicle screws and   intervening rods. Anterior L5-S1 fixation. Soft tissue is present within   theoperative bed consistent with postsurgical change. Cannot rule out   superimposed infection. Cannot evaluate the spinal canal and exit   foramina due to limitations of CT and adjacent metallic hardware   artifact. Recommend clinical evaluation and follow-up.    Hepatic steatosis and hepatomegaly.    Renal hypodensity too small to characterize with certainty.    Mildly enlarged right hilar lymph node.          < end of copied text >        ROS  [  ] UNABLE TO ELICIT HPI:  Ms. Glo Reilly is a 54 year old Sammarinese-speaking female with no PMHx presenting to the ED with chief complaint of vomiting. She reports that this morning she experiencing numerous episodes of NBNB vomiting with nausea and one episode of diarrhea. She reports associated symptoms of diffuse abdominal pain, as well as subjective fever and chills. She denies chest pain, dizziness, or any other symptoms at this time. She denies any recent travel or sick contacts. She denies eating any unusual food lately. She has no other complaints at this time.  Patient is from home and generally independent, but now ambulates with walker s/p traumatic work related injury 1 month ago and subsequent spinal surgery.    History as above, Pt who presented with intractable vomiting and abdominal pain x 2 days , she is doing a little better , she is still vomiting but much less and still has abdominal pain but has not had any BM, she is passing flatus. No other complaints. No fevers, chills.      PAST MEDICAL & SURGICAL HISTORY:  No pertinent past medical history  History of back surgery: s/p work related accident      aspirin (Rash)      Meds:  acetaminophen   Tablet .. 650 milliGRAM(s) Oral every 6 hours PRN  ciprofloxacin   IVPB 400 milliGRAM(s) IV Intermittent every 12 hours  ciprofloxacin   IVPB      enoxaparin Injectable 40 milliGRAM(s) SubCutaneous daily  famotidine Injectable 20 milliGRAM(s) IV Push every 12 hours  lactated ringers. 1000 milliLiter(s) IV Continuous <Continuous>  metroNIDAZOLE  IVPB      metroNIDAZOLE  IVPB 500 milliGRAM(s) IV Intermittent every 8 hours  morphine  - Injectable 2 milliGRAM(s) IV Push every 6 hours PRN  ondansetron Injectable 4 milliGRAM(s) IV Push every 6 hours PRN      SOCIAL HISTORY:  Smoker: no  ETOH use: no      FAMILY HISTORY:  No pertinent family history in first degree relatives      VITALS:  Vital Signs Last 24 Hrs  T(C): 37.1 (2019 14:12), Max: 37.2 (2019 21:29)  T(F): 98.8 (2019 14:12), Max: 99 (2019 21:29)  HR: 68 (2019 14:12) (68 - 91)  BP: 111/62 (2019 14:12) (108/56 - 137/96)  BP(mean): 69 (2019 19:00) (69 - 69)  RR: 16 (2019 14:12) (16 - 19)  SpO2: 100% (2019 14:12) (98% - 100%)    LABS/DIAGNOSTIC TESTS:                          12.0   12.91 )-----------( 317      ( 2019 07:29 )             37.0     WBC Count: 12.91 K/uL ( @ 07:29)  WBC Count: 15.19 K/uL ( @ 13:45)          145  |  112<H>  |  10  ----------------------------<  94  3.1<L>   |  27  |  0.69    Ca    8.2<L>      2019 07:29  Phos  2.7       Mg     1.9         TPro  7.1  /  Alb  3.2<L>  /  TBili  0.5  /  DBili  x   /  AST  17  /  ALT  23  /  AlkPhos  94        Urinalysis Basic - ( 2019 13:45 )    Color: Yellow / Appearance: Clear / S.010 / pH: x  Gluc: x / Ketone: Moderate  / Bili: Negative / Urobili: Negative   Blood: x / Protein: Negative / Nitrite: Negative   Leuk Esterase: Negative / RBC: x / WBC x   Sq Epi: x / Non Sq Epi: x / Bacteria: x        LIVER FUNCTIONS - ( 2019 07:29 )  Alb: 3.2 g/dL / Pro: 7.1 g/dL / ALK PHOS: 94 U/L / ALT: 23 U/L DA / AST: 17 U/L / GGT: x             PT/INR - ( 2019 13:45 )   PT: 11.2 sec;   INR: 1.01 ratio         PTT - ( 2019 13:45 )  PTT:31.7 sec    LACTATE:Lactate, Blood: 1.2 mmol/L ( @ 07:28)      ABG -     CULTURES:       RADIOLOGY:< from: CT Abdomen and Pelvis w/ IV Cont (19 @ 16:30) >  EXAM:  CT ABDOMEN AND PELVIS IC                            PROCEDURE DATE:  2019          INTERPRETATION:  CLINICAL INDICATION: 54 years  Female with diffuse abd   pain, vomiting, fever, diarrhea. Lumbosacral spine surgery one month ago.    COMPARISON: None.    PROCEDURE:   CT of the Abdomen and Pelvis was performed with intravenous contrast.   Intravenous contrast: 90 ml Omnipaque 350. 10 ml discarded.  Oral contrast: None.  Sagittal and coronal reformats were performed.    LIMITATION: Lack of enteric contrast limits evaluation of the bowel.   Motion artifact.    FINDINGS:    LOWER CHEST: Bilateral lower lobe dependent atelectasis. Mildly enlarged   right hilar lymph node measuring up to 7 mm in short axis.    LIVER: The liver is lowin attenuation secondary to hepatic steatosis.   There is no focal mass or intrahepatic biliary duct distention. The liver   is enlarged with the right lobe measuring 22.5 cm sagittal.  BILE DUCTS: Normal caliber.  GALLBLADDER: Within normal limits.  SPLEEN: Within normal limits.  PANCREAS: Within normal limits.  ADRENALS: Within normal limits.  KIDNEYS/URETERS: 6 mm right midpole renal hypodensity too small to   characterize with certainty. No other renal mass. No hydronephrosis or   calculus. The kidneys enhance symmetrically. The ureters are not   distended.    BLADDER: Within normal limits.  REPRODUCTIVE ORGANS: The uterus contains a 3 mm calcified intramural   myoma. Otherwise unremarkable.    BOWEL: Diffuse colonic wall thickening. No pericolonic inflammatory   stranding. The appendix is normal.  PERITONEUM: No ascites.  VESSELS:  Within normal limits.  RETROPERITONEUM: No lymphadenopathy.    ABDOMINAL WALL: Within normal limits.  BONES: Status post L3, L4 and L5 laminectomies with bilateral pedicle   screws and intervening rods. Anterior L5-S1 fixation. Soft tissue is   present within the operative bed. Cannot evaluate the spinal canal and   exit foramina due to limitations of CT and adjacent metallic hardware   artifact.     IMPRESSION:     Diffuse colonic wall thickening consistent with under distention or   colitis.    Status post L3, L4 and L5 laminectomies with bilateral pedicle screws and   intervening rods. Anterior L5-S1 fixation. Soft tissue is present within   theoperative bed consistent with postsurgical change. Cannot rule out   superimposed infection. Cannot evaluate the spinal canal and exit   foramina due to limitations of CT and adjacent metallic hardware   artifact. Recommend clinical evaluation and follow-up.    Hepatic steatosis and hepatomegaly.    Renal hypodensity too small to characterize with certainty.    Mildly enlarged right hilar lymph node.          < end of copied text >        ROS  [  ] UNABLE TO ELICIT

## 2019-06-06 NOTE — PHYSICAL THERAPY INITIAL EVALUATION ADULT - CRITERIA FOR SKILLED THERAPEUTIC INTERVENTIONS
impairments found/functional limitations in following categories/predicted duration of therapy intervention/therapy frequency/risk reduction/prevention/rehab potential/anticipated discharge recommendation

## 2019-06-07 ENCOUNTER — TRANSCRIPTION ENCOUNTER (OUTPATIENT)
Age: 54
End: 2019-06-07

## 2019-06-07 LAB
ALBUMIN SERPL ELPH-MCNC: 3.3 G/DL — LOW (ref 3.5–5)
ALP SERPL-CCNC: 95 U/L — SIGNIFICANT CHANGE UP (ref 40–120)
ALT FLD-CCNC: 32 U/L DA — SIGNIFICANT CHANGE UP (ref 10–60)
ANION GAP SERPL CALC-SCNC: 5 MMOL/L — SIGNIFICANT CHANGE UP (ref 5–17)
AST SERPL-CCNC: 27 U/L — SIGNIFICANT CHANGE UP (ref 10–40)
BILIRUB SERPL-MCNC: 0.5 MG/DL — SIGNIFICANT CHANGE UP (ref 0.2–1.2)
BUN SERPL-MCNC: 6 MG/DL — LOW (ref 7–18)
C DIFF BY PCR RESULT: SIGNIFICANT CHANGE UP
C DIFF TOX GENS STL QL NAA+PROBE: SIGNIFICANT CHANGE UP
CALCIUM SERPL-MCNC: 8.2 MG/DL — LOW (ref 8.4–10.5)
CHLORIDE SERPL-SCNC: 109 MMOL/L — HIGH (ref 96–108)
CO2 SERPL-SCNC: 28 MMOL/L — SIGNIFICANT CHANGE UP (ref 22–31)
CREAT SERPL-MCNC: 0.62 MG/DL — SIGNIFICANT CHANGE UP (ref 0.5–1.3)
GLUCOSE SERPL-MCNC: 96 MG/DL — SIGNIFICANT CHANGE UP (ref 70–99)
HCT VFR BLD CALC: 36 % — SIGNIFICANT CHANGE UP (ref 34.5–45)
HGB BLD-MCNC: 12 G/DL — SIGNIFICANT CHANGE UP (ref 11.5–15.5)
MCHC RBC-ENTMCNC: 29 PG — SIGNIFICANT CHANGE UP (ref 27–34)
MCHC RBC-ENTMCNC: 33.3 GM/DL — SIGNIFICANT CHANGE UP (ref 32–36)
MCV RBC AUTO: 87 FL — SIGNIFICANT CHANGE UP (ref 80–100)
NRBC # BLD: 0 /100 WBCS — SIGNIFICANT CHANGE UP (ref 0–0)
PLATELET # BLD AUTO: 308 K/UL — SIGNIFICANT CHANGE UP (ref 150–400)
POTASSIUM SERPL-MCNC: 3.2 MMOL/L — LOW (ref 3.5–5.3)
POTASSIUM SERPL-SCNC: 3.2 MMOL/L — LOW (ref 3.5–5.3)
PROT SERPL-MCNC: 7.1 G/DL — SIGNIFICANT CHANGE UP (ref 6–8.3)
RBC # BLD: 4.14 M/UL — SIGNIFICANT CHANGE UP (ref 3.8–5.2)
RBC # FLD: 14.7 % — HIGH (ref 10.3–14.5)
SODIUM SERPL-SCNC: 142 MMOL/L — SIGNIFICANT CHANGE UP (ref 135–145)
WBC # BLD: 10.52 K/UL — HIGH (ref 3.8–10.5)
WBC # FLD AUTO: 10.52 K/UL — HIGH (ref 3.8–10.5)

## 2019-06-07 RX ORDER — POTASSIUM CHLORIDE 20 MEQ
40 PACKET (EA) ORAL ONCE
Refills: 0 | Status: COMPLETED | OUTPATIENT
Start: 2019-06-07 | End: 2019-06-07

## 2019-06-07 RX ADMIN — MORPHINE SULFATE 2 MILLIGRAM(S): 50 CAPSULE, EXTENDED RELEASE ORAL at 09:05

## 2019-06-07 RX ADMIN — MORPHINE SULFATE 2 MILLIGRAM(S): 50 CAPSULE, EXTENDED RELEASE ORAL at 08:36

## 2019-06-07 RX ADMIN — SODIUM CHLORIDE 100 MILLILITER(S): 9 INJECTION, SOLUTION INTRAVENOUS at 21:30

## 2019-06-07 RX ADMIN — Medication 100 MILLIGRAM(S): at 05:19

## 2019-06-07 RX ADMIN — MORPHINE SULFATE 2 MILLIGRAM(S): 50 CAPSULE, EXTENDED RELEASE ORAL at 22:00

## 2019-06-07 RX ADMIN — Medication 200 MILLIGRAM(S): at 05:19

## 2019-06-07 RX ADMIN — Medication 650 MILLIGRAM(S): at 05:18

## 2019-06-07 RX ADMIN — Medication 650 MILLIGRAM(S): at 11:15

## 2019-06-07 RX ADMIN — Medication 650 MILLIGRAM(S): at 05:50

## 2019-06-07 RX ADMIN — Medication 100 MILLIGRAM(S): at 13:18

## 2019-06-07 RX ADMIN — Medication 40 MILLIEQUIVALENT(S): at 11:15

## 2019-06-07 RX ADMIN — Medication 650 MILLIGRAM(S): at 12:35

## 2019-06-07 RX ADMIN — Medication 650 MILLIGRAM(S): at 18:30

## 2019-06-07 RX ADMIN — MORPHINE SULFATE 2 MILLIGRAM(S): 50 CAPSULE, EXTENDED RELEASE ORAL at 21:29

## 2019-06-07 RX ADMIN — Medication 100 MILLIGRAM(S): at 21:29

## 2019-06-07 RX ADMIN — FAMOTIDINE 20 MILLIGRAM(S): 10 INJECTION INTRAVENOUS at 05:19

## 2019-06-07 RX ADMIN — Medication 200 MILLIGRAM(S): at 17:08

## 2019-06-07 RX ADMIN — ONDANSETRON 4 MILLIGRAM(S): 8 TABLET, FILM COATED ORAL at 08:32

## 2019-06-07 RX ADMIN — Medication 650 MILLIGRAM(S): at 17:10

## 2019-06-07 RX ADMIN — FAMOTIDINE 20 MILLIGRAM(S): 10 INJECTION INTRAVENOUS at 17:08

## 2019-06-07 RX ADMIN — SODIUM CHLORIDE 100 MILLILITER(S): 9 INJECTION, SOLUTION INTRAVENOUS at 05:19

## 2019-06-07 RX ADMIN — ENOXAPARIN SODIUM 40 MILLIGRAM(S): 100 INJECTION SUBCUTANEOUS at 11:15

## 2019-06-07 NOTE — PROGRESS NOTE ADULT - ASSESSMENT
1. Pancolitis     Suggestions:    1. Follow up stool study  2. Continue antibiotics  3. Monitor electrolytes  4. DVT prophylaxis

## 2019-06-07 NOTE — DISCHARGE NOTE PROVIDER - NSDCCPCAREPLAN_GEN_ALL_CORE_FT
PRINCIPAL DISCHARGE DIAGNOSIS  Diagnosis: Colitis  Assessment and Plan of Treatment: 1. Your admitted to the hospital for an infection of the colon - colitis.  2. Please follow up with GI - Dr. Costello in 1-2 weeks.   3. Continue with Cipro 500 mg 2 x a day and Flagyl 500 mg 3 x a day as prescribed.  4. Follow up with your PCP in 1 week.   5. Return to the ED with any worsening symptoms.      SECONDARY DISCHARGE DIAGNOSES  Diagnosis: Intractable vomiting  Assessment and Plan of Treatment: 1. Continue with Zofran as need for nausea and or vomiting PRINCIPAL DISCHARGE DIAGNOSIS  Diagnosis: Colitis  Assessment and Plan of Treatment: 1. Your admitted to the hospital for an infection of the colon - colitis.  2. Please follow up with GI - Dr. Costello in 1-2 weeks.   3. Continue with Cipro 500 mg 2 x a day and Flagyl 500 mg 3 x a day as prescribed.  4.  Take Zofran as needed for nausea  5. Follow up with your PCP in 1 week.   6. Return to the ED with any worsening symptoms.        SECONDARY DISCHARGE DIAGNOSES  Diagnosis: Intractable vomiting  Assessment and Plan of Treatment: 1. Continue with Zofran as need for nausea and or vomiting

## 2019-06-07 NOTE — DISCHARGE NOTE PROVIDER - CARE PROVIDER_API CALL
Ba Costello)  Medicine  [address] [place], [place] [address]  Phone: [phone]  Fax: [phone]  Follow Up Time:

## 2019-06-07 NOTE — DISCHARGE NOTE PROVIDER - HOSPITAL COURSE
54 year old [place]i-speaking female with no PMHx presenting to the ED with chief complaint of vomiting. Reported numerous episodes of NBNB vomiting with nausea and one episode of diarrhea. She reported associated symptoms of diffuse abdominal pain, as well as subjective fever and chills. She denies chest pain, dizziness, or any other symptoms at this time. She denies any recent travel or sick contacts. She denies eating any unusual food lately. Admitted to medicine for intractable vomiting 2/2 to infectious colitis. ID and GI consults appreciated______ 54 year old Malagasy-speaking female with no PMHx presenting to the ED with chief complaint of vomiting. Reported numerous episodes of NBNB vomiting with nausea and one episode of diarrhea. She reported associated symptoms of diffuse abdominal pain, as well as subjective fever and chills. She denies chest pain, dizziness, or any other symptoms at this time. She denies any recent travel or sick contacts. She denies eating any unusual food lately. Admitted to medicine for intractable vomiting 2/2 to infectious colitis. ID and GI consults appreciated.        CT A/P 6/5/19->Diffuse colonic wall thickening consistent with under distention or colitis. Status post L3, L4 and L5 laminectomies with bilateral pedicle screws and intervening rods. Anterior L5-S1 fixation. Soft tissue is present within the operative bed consistent with postsurgical change. Cannot rule out superimposed infection. Cannot evaluate the spinal canal and exit foramina due to limitations of CT and adjacent metallic hardware     artifact. Recommend clinical evaluation and follow-up.    Hepatic steatosis and hepatomegaly.    Renal hypodensity too small to characterize with certainty.    Mildly enlarged right hilar lymph node.        Pt. admitted to medicine for Colitis, treated with IV Cipro and Flagyl with improvement, currently with no further abdominal pain, N/V, diarrhea, tolerating PO regular diet well.  Pt. afebrile with no leukocytosis, stool and blood cultures negative, C. diff negative. 54 year old Citizen of [place]-speaking female with no PMHx presenting to the ED with chief complaint of vomiting. Reported numerous episodes of NBNB vomiting with nausea and one episode of diarrhea. She reported associated symptoms of diffuse abdominal pain, as well as subjective fever and chills. She denies chest pain, dizziness, or any other symptoms at this time. She denies any recent travel or sick contacts. She denies eating any unusual food lately. Admitted to medicine for intractable vomiting 2/2 to infectious colitis. ID and GI consults appreciated.        CT A/P 6/5/19->Diffuse colonic wall thickening consistent with under distention or colitis. Status post L3, L4 and L5 laminectomies with bilateral pedicle screws and intervening rods. Anterior L5-S1 fixation. Soft tissue is present within the operative bed consistent with postsurgical change. Cannot rule out superimposed infection. Cannot evaluate the spinal canal and exit foramina due to limitations of CT and adjacent metallic hardware     artifact. Recommend clinical evaluation and follow-up.    Hepatic steatosis and hepatomegaly.    Renal hypodensity too small to characterize with certainty.    Mildly enlarged right hilar lymph node.        Pt. admitted to medicine for Colitis, treated with IV Cipro and Flagyl with improvement, currently with no further abdominal pain, N/V, diarrhea, tolerating PO regular diet well.  Pt. afebrile with no leukocytosis, stool and blood cultures negative, C. diff negative.    Discussed with attending, pt. is medically stable for discharge to home on PO abx for a total of 7days.

## 2019-06-07 NOTE — PROGRESS NOTE ADULT - ASSESSMENT
This is a 54 year old female admitted to medicine for intractable vomiting 2/2 to infectious colitis.

## 2019-06-08 LAB
CULTURE RESULTS: SIGNIFICANT CHANGE UP
SPECIMEN SOURCE: SIGNIFICANT CHANGE UP

## 2019-06-08 RX ADMIN — ONDANSETRON 4 MILLIGRAM(S): 8 TABLET, FILM COATED ORAL at 18:52

## 2019-06-08 RX ADMIN — MORPHINE SULFATE 2 MILLIGRAM(S): 50 CAPSULE, EXTENDED RELEASE ORAL at 18:56

## 2019-06-08 RX ADMIN — Medication 200 MILLIGRAM(S): at 17:25

## 2019-06-08 RX ADMIN — Medication 100 MILLIGRAM(S): at 14:11

## 2019-06-08 RX ADMIN — Medication 200 MILLIGRAM(S): at 05:24

## 2019-06-08 RX ADMIN — Medication 100 MILLIGRAM(S): at 21:31

## 2019-06-08 RX ADMIN — ONDANSETRON 4 MILLIGRAM(S): 8 TABLET, FILM COATED ORAL at 09:13

## 2019-06-08 RX ADMIN — Medication 100 MILLIGRAM(S): at 05:24

## 2019-06-08 RX ADMIN — MORPHINE SULFATE 2 MILLIGRAM(S): 50 CAPSULE, EXTENDED RELEASE ORAL at 10:05

## 2019-06-08 RX ADMIN — MORPHINE SULFATE 2 MILLIGRAM(S): 50 CAPSULE, EXTENDED RELEASE ORAL at 09:13

## 2019-06-08 RX ADMIN — FAMOTIDINE 20 MILLIGRAM(S): 10 INJECTION INTRAVENOUS at 05:23

## 2019-06-08 RX ADMIN — SODIUM CHLORIDE 100 MILLILITER(S): 9 INJECTION, SOLUTION INTRAVENOUS at 21:35

## 2019-06-08 RX ADMIN — ENOXAPARIN SODIUM 40 MILLIGRAM(S): 100 INJECTION SUBCUTANEOUS at 12:32

## 2019-06-08 RX ADMIN — FAMOTIDINE 20 MILLIGRAM(S): 10 INJECTION INTRAVENOUS at 17:25

## 2019-06-08 RX ADMIN — SODIUM CHLORIDE 100 MILLILITER(S): 9 INJECTION, SOLUTION INTRAVENOUS at 05:24

## 2019-06-08 RX ADMIN — MORPHINE SULFATE 2 MILLIGRAM(S): 50 CAPSULE, EXTENDED RELEASE ORAL at 19:03

## 2019-06-08 NOTE — PROGRESS NOTE ADULT - ATTENDING COMMENTS
dc home on po cipro/ flagyl
pt seen and evaluated  agree with note above  f/u stool studies  cont cipro/flagyl  gi and id consulted
I agree with above

## 2019-06-08 NOTE — PROGRESS NOTE ADULT - ASSESSMENT
Colitis  Leukocytosis - improving    plan   - cont cipro 400mgs iv q12hrs  - cont flagyl 500mgs iv q8hrs

## 2019-06-08 NOTE — CHART NOTE - NSCHARTNOTEFT_GEN_A_CORE
Patient continues to complain of nausea and vomiting - not tolerating diet at this time. Will keep NPO as patient is still actively vomiting. Primary team to advance diet as tolerated.

## 2019-06-09 LAB
CULTURE RESULTS: SIGNIFICANT CHANGE UP
GLUCOSE BLDC GLUCOMTR-MCNC: 127 MG/DL — HIGH (ref 70–99)
SPECIMEN SOURCE: SIGNIFICANT CHANGE UP

## 2019-06-09 RX ORDER — METOCLOPRAMIDE HCL 10 MG
5 TABLET ORAL ONCE
Refills: 0 | Status: DISCONTINUED | OUTPATIENT
Start: 2019-06-09 | End: 2019-06-11

## 2019-06-09 RX ORDER — METOCLOPRAMIDE HCL 10 MG
10 TABLET ORAL ONCE
Refills: 0 | Status: COMPLETED | OUTPATIENT
Start: 2019-06-09 | End: 2019-06-09

## 2019-06-09 RX ADMIN — MORPHINE SULFATE 2 MILLIGRAM(S): 50 CAPSULE, EXTENDED RELEASE ORAL at 01:16

## 2019-06-09 RX ADMIN — ENOXAPARIN SODIUM 40 MILLIGRAM(S): 100 INJECTION SUBCUTANEOUS at 13:18

## 2019-06-09 RX ADMIN — Medication 10 MILLIGRAM(S): at 19:00

## 2019-06-09 RX ADMIN — Medication 100 MILLIGRAM(S): at 21:40

## 2019-06-09 RX ADMIN — MORPHINE SULFATE 2 MILLIGRAM(S): 50 CAPSULE, EXTENDED RELEASE ORAL at 17:30

## 2019-06-09 RX ADMIN — FAMOTIDINE 20 MILLIGRAM(S): 10 INJECTION INTRAVENOUS at 17:33

## 2019-06-09 RX ADMIN — ONDANSETRON 4 MILLIGRAM(S): 8 TABLET, FILM COATED ORAL at 01:16

## 2019-06-09 RX ADMIN — Medication 100 MILLIGRAM(S): at 06:23

## 2019-06-09 RX ADMIN — Medication 200 MILLIGRAM(S): at 17:32

## 2019-06-09 RX ADMIN — MORPHINE SULFATE 2 MILLIGRAM(S): 50 CAPSULE, EXTENDED RELEASE ORAL at 02:00

## 2019-06-09 RX ADMIN — ONDANSETRON 4 MILLIGRAM(S): 8 TABLET, FILM COATED ORAL at 13:20

## 2019-06-09 RX ADMIN — MORPHINE SULFATE 2 MILLIGRAM(S): 50 CAPSULE, EXTENDED RELEASE ORAL at 18:35

## 2019-06-09 RX ADMIN — Medication 100 MILLIGRAM(S): at 13:16

## 2019-06-09 RX ADMIN — Medication 200 MILLIGRAM(S): at 06:23

## 2019-06-09 RX ADMIN — FAMOTIDINE 20 MILLIGRAM(S): 10 INJECTION INTRAVENOUS at 06:23

## 2019-06-09 NOTE — PROGRESS NOTE ADULT - NEGATIVE GENERAL SYMPTOMS
no anorexia/no chills/no sweating/no fever
no fever/no sweating/no anorexia/no weight loss/no chills
no fever/no chills

## 2019-06-09 NOTE — PROGRESS NOTE ADULT - CONSTITUTIONAL DETAILS
no distress/well-nourished/well-groomed/well-developed
well-groomed/well-nourished/no distress/well-developed
well-groomed/well-developed/no distress/well-nourished

## 2019-06-09 NOTE — PROGRESS NOTE ADULT - GASTROINTESTINAL DETAILS
soft/no distention/bowel sounds normal
nontender/bowel sounds normal/soft/no rigidity/no guarding/no rebound tenderness/no distention
soft/no rigidity/nontender/no guarding/no rebound tenderness/no distention

## 2019-06-09 NOTE — PROGRESS NOTE ADULT - NEGATIVE RESPIRATORY AND THORAX SYMPTOMS
no dyspnea/no cough
no cough/no hemoptysis/no dyspnea/no wheezing
no hemoptysis/no wheezing/no dyspnea/no cough

## 2019-06-09 NOTE — PROGRESS NOTE ADULT - NEUROLOGICAL DETAILS
alert and oriented x 3
responds to pain/responds to verbal commands/cranial nerves intact/sensation intact
responds to verbal commands/responds to pain/sensation intact

## 2019-06-09 NOTE — PROGRESS NOTE ADULT - NEGATIVE PSYCHIATRIC SYMPTOMS
no suicidal ideation/no depression/no agitation/no anxiety
no depression/no anxiety/no suicidal ideation

## 2019-06-09 NOTE — CHART NOTE - NSCHARTNOTEFT_GEN_A_CORE
Pt's diet to be advanced as tolerated per GI per attending note. Pt had a vomiting episode with dinner yesterday. Pt wishes to try to eat again today. Advanced diet to CL, will observe pt for tolerance. Primary team to please follow up.

## 2019-06-09 NOTE — PROGRESS NOTE ADULT - NEGATIVE ENMT SYMPTOMS
no gum bleeding/no ear pain/no hearing difficulty/no throat pain/no dysphagia/no nasal discharge/no nose bleeds/no dry mouth

## 2019-06-09 NOTE — PROGRESS NOTE ADULT - NEGATIVE GASTROINTESTINAL SYMPTOMS
no diarrhea
no abdominal pain/no melena/no hematochezia/no jaundice/no nausea/no vomiting/no diarrhea
no melena/no jaundice/no vomiting/no nausea/no diarrhea/no abdominal pain/no steatorrhea/no hematochezia

## 2019-06-09 NOTE — PROGRESS NOTE ADULT - ASSESSMENT
1. Pancolitis  2. Abdominal pain improved    Suggestions:    1. Continue antibiotics  2. Monitor electrolytes  3. Avoid NSAID  4. Advance diet as tolerated  5. DVT prophylaxis

## 2019-06-09 NOTE — PROGRESS NOTE ADULT - RS GEN PE MLT RESP DETAILS PC
respirations non-labored/clear to auscultation bilaterally/good air movement/airway patent/breath sounds equal
breath sounds equal/good air movement/airway patent
respirations non-labored/clear to auscultation bilaterally/airway patent/good air movement/breath sounds equal

## 2019-06-10 DIAGNOSIS — E87.8 OTHER DISORDERS OF ELECTROLYTE AND FLUID BALANCE, NOT ELSEWHERE CLASSIFIED: ICD-10-CM

## 2019-06-10 LAB
ANION GAP SERPL CALC-SCNC: 8 MMOL/L — SIGNIFICANT CHANGE UP (ref 5–17)
BUN SERPL-MCNC: 5 MG/DL — LOW (ref 7–18)
CALCIUM SERPL-MCNC: 8.5 MG/DL — SIGNIFICANT CHANGE UP (ref 8.4–10.5)
CHLORIDE SERPL-SCNC: 107 MMOL/L — SIGNIFICANT CHANGE UP (ref 96–108)
CO2 SERPL-SCNC: 27 MMOL/L — SIGNIFICANT CHANGE UP (ref 22–31)
CREAT SERPL-MCNC: 0.69 MG/DL — SIGNIFICANT CHANGE UP (ref 0.5–1.3)
CULTURE RESULTS: SIGNIFICANT CHANGE UP
CULTURE RESULTS: SIGNIFICANT CHANGE UP
GLUCOSE SERPL-MCNC: 112 MG/DL — HIGH (ref 70–99)
HCT VFR BLD CALC: 38.5 % — SIGNIFICANT CHANGE UP (ref 34.5–45)
HGB BLD-MCNC: 12.6 G/DL — SIGNIFICANT CHANGE UP (ref 11.5–15.5)
MAGNESIUM SERPL-MCNC: 1.9 MG/DL — SIGNIFICANT CHANGE UP (ref 1.6–2.6)
MCHC RBC-ENTMCNC: 28.3 PG — SIGNIFICANT CHANGE UP (ref 27–34)
MCHC RBC-ENTMCNC: 32.7 GM/DL — SIGNIFICANT CHANGE UP (ref 32–36)
MCV RBC AUTO: 86.5 FL — SIGNIFICANT CHANGE UP (ref 80–100)
NRBC # BLD: 0 /100 WBCS — SIGNIFICANT CHANGE UP (ref 0–0)
PHOSPHATE SERPL-MCNC: 2.3 MG/DL — LOW (ref 2.5–4.5)
PLATELET # BLD AUTO: 322 K/UL — SIGNIFICANT CHANGE UP (ref 150–400)
POTASSIUM SERPL-MCNC: 3 MMOL/L — LOW (ref 3.5–5.3)
POTASSIUM SERPL-SCNC: 3 MMOL/L — LOW (ref 3.5–5.3)
RBC # BLD: 4.45 M/UL — SIGNIFICANT CHANGE UP (ref 3.8–5.2)
RBC # FLD: 15 % — HIGH (ref 10.3–14.5)
SODIUM SERPL-SCNC: 142 MMOL/L — SIGNIFICANT CHANGE UP (ref 135–145)
SPECIMEN SOURCE: SIGNIFICANT CHANGE UP
SPECIMEN SOURCE: SIGNIFICANT CHANGE UP
WBC # BLD: 12.52 K/UL — HIGH (ref 3.8–10.5)
WBC # FLD AUTO: 12.52 K/UL — HIGH (ref 3.8–10.5)

## 2019-06-10 RX ORDER — FAMOTIDINE 10 MG/ML
20 INJECTION INTRAVENOUS EVERY 12 HOURS
Refills: 0 | Status: DISCONTINUED | OUTPATIENT
Start: 2019-06-10 | End: 2019-06-11

## 2019-06-10 RX ORDER — POTASSIUM PHOSPHATE, MONOBASIC POTASSIUM PHOSPHATE, DIBASIC 236; 224 MG/ML; MG/ML
30 INJECTION, SOLUTION INTRAVENOUS ONCE
Refills: 0 | Status: COMPLETED | OUTPATIENT
Start: 2019-06-10 | End: 2019-06-10

## 2019-06-10 RX ORDER — POTASSIUM CHLORIDE 20 MEQ
40 PACKET (EA) ORAL ONCE
Refills: 0 | Status: COMPLETED | OUTPATIENT
Start: 2019-06-10 | End: 2019-06-10

## 2019-06-10 RX ADMIN — Medication 100 MILLIGRAM(S): at 21:36

## 2019-06-10 RX ADMIN — Medication 200 MILLIGRAM(S): at 05:49

## 2019-06-10 RX ADMIN — Medication 200 MILLIGRAM(S): at 17:36

## 2019-06-10 RX ADMIN — SODIUM CHLORIDE 100 MILLILITER(S): 9 INJECTION, SOLUTION INTRAVENOUS at 05:49

## 2019-06-10 RX ADMIN — Medication 40 MILLIEQUIVALENT(S): at 18:08

## 2019-06-10 RX ADMIN — Medication 100 MILLIGRAM(S): at 14:47

## 2019-06-10 RX ADMIN — FAMOTIDINE 20 MILLIGRAM(S): 10 INJECTION INTRAVENOUS at 05:48

## 2019-06-10 RX ADMIN — FAMOTIDINE 20 MILLIGRAM(S): 10 INJECTION INTRAVENOUS at 17:36

## 2019-06-10 RX ADMIN — Medication 100 MILLIGRAM(S): at 05:49

## 2019-06-10 RX ADMIN — ENOXAPARIN SODIUM 40 MILLIGRAM(S): 100 INJECTION SUBCUTANEOUS at 12:22

## 2019-06-10 RX ADMIN — POTASSIUM PHOSPHATE, MONOBASIC POTASSIUM PHOSPHATE, DIBASIC 83.33 MILLIMOLE(S): 236; 224 INJECTION, SOLUTION INTRAVENOUS at 18:08

## 2019-06-10 NOTE — DIETITIAN INITIAL EVALUATION ADULT. - OTHER INFO
Patient from home lives with family. Visited pt. alert report fair po intake with nausea/vomiting & 1 episode of diarrhea PTA, stated unsure of UBW but current wt. 184 Lbs noted. Presently pt. diet advance from clear liquid to DASH/TLC diet at lunch, consumed >50% of meal & tolerating, provided food preferences, followed  by GI/team & consult noted, skin intact. D/w RN.

## 2019-06-10 NOTE — DIETITIAN INITIAL EVALUATION ADULT. - FACTORS AFF FOOD INTAKE
abdominal pain, fever, noninfectious gastroenteritis, colitis/persistent nausea/pain/vomiting/other (specify)

## 2019-06-10 NOTE — PROGRESS NOTE ADULT - PROBLEM SELECTOR PROBLEM 2
Intractable vomiting
Lactic acidosis
Intractable vomiting
Lactic acidosis

## 2019-06-10 NOTE — PROGRESS NOTE ADULT - PROVIDER SPECIALTY LIST ADULT
Gastroenterology
Infectious Disease
Internal Medicine
Gastroenterology
Gastroenterology

## 2019-06-10 NOTE — PROGRESS NOTE ADULT - PROBLEM SELECTOR PROBLEM 3
Electrolyte imbalance
Need for prophylactic measure

## 2019-06-10 NOTE — PROGRESS NOTE ADULT - PROBLEM SELECTOR PLAN 1
CT A/P->Diffuse colonic wall thickening consistent with under distention or   colitis.  -ID Dr. Sewell  -WBC fluctuating, 12.5 today, pt. afebrile  -Blood, stool Cx neg  -Cont Cipro/Flagyl
CT abdomen suggestive of pancolitis   Seen by GI-Dr. Costello  Continue Cipro and Flagyl  Advance diet to clears as tolerated  c/w IV fluids  zofran prn  avoid NSAIDS  ID consulted - Dr. Sewell.   Stool for O&P and C-diff ordered    pt feeling better. wbc improved  change to po cipro/flagyl  dc home with outpt f/u
1. Seen by Dr. Costello - GI   2. Continue Cipro and Flagyl  3. Diet advanced as tolerated per GI and noted with episode of vomiting after dinner.  4. c/w IV fluids  5. Zofran prn  6. ID consult appreciated - Dr. Sewell.   7. Stool for O&P and C-diff ordered  8. d/c planning with PO cipro/flagyl, with outpatient f/u.   9. Will c/w cipro/flagyl IV for now - given episodes of vomiting   9. AM labs ordered
CT abdomen suggestive of pancolitis   Seen by GI-Dr. Costello  Continue Cipro and Flagyl  Advance diet to clears as tolerated  c/w IV fluids  zofran prn  avoid NSAIDS  ID consulted - Dr. Sewell.   Stool for O&P and C-diff ordered  CBC in AM

## 2019-06-10 NOTE — PROGRESS NOTE ADULT - ASSESSMENT
Ms. Glo Reilly is a 54 year old [place]ian-speaking female with no PMHx presenting to the ED with chief complaint of vomiting. She reports that this morning she experiencing numerous episodes of NBNB vomiting with nausea and one episode of diarrhea. She reports associated symptoms of diffuse abdominal pain, as well as subjective fever and chills. She denies chest pain, dizziness, or any other symptoms at this time. She denies any recent travel or sick contacts. She denies eating any unusual food lately. She has no other complaints at this time.    Patient is from home and generally independent, but now ambulates with walker s/p traumatic work related injury 1 month ago and subsequent spinal surgery.    Pt. seen and examined, reports persisting but improved epigastric pain, last vomiting episode last night.  Diet advanced and tolerated, currently with no further N/V diarrhea.

## 2019-06-10 NOTE — PROGRESS NOTE ADULT - PROBLEM SELECTOR PLAN 2
Last vomiting episode last night  -Diet advanced to reg and tolerated  -GI Dr. Costello, note appreciated  -No invasive interventions at this time
resolved-
1. c/w Zofran PRN
resolved- last lactic acid level this Am- 1.2  Continue Lactated ringers @100ml/hr  repeat lytes in AM

## 2019-06-10 NOTE — DIETITIAN INITIAL EVALUATION ADULT. - PERTINENT LABORATORY DATA
06-10 Na142 mmol/L Glu 112 mg/dL<H> K+ 3.0 mmol/L<L> Cr  0.69 mg/dL BUN 5 mg/dL<L> 06-10 Phos 2.3 mg/dL<L> 06-07 Alb 3.3 g/dL<L> 06-06 [id number] 5.1 % 06-06 Chol 153 mg/dL LDL 84 mg/dL HDL 52 mg/dL Trig 87 mg/dL

## 2019-06-10 NOTE — PROGRESS NOTE ADULT - SUBJECTIVE AND OBJECTIVE BOX
NP Note discussed with  Primary Attending    Patient is a 54y old  Female who presents with a chief complaint of intractable vomiting (09 Jun 2019 17:30)      INTERVAL HPI/OVERNIGHT EVENTS: no new complaints    MEDICATIONS  (STANDING):  ciprofloxacin   IVPB 400 milliGRAM(s) IV Intermittent every 12 hours  ciprofloxacin   IVPB      enoxaparin Injectable 40 milliGRAM(s) SubCutaneous daily  famotidine Injectable 20 milliGRAM(s) IV Push every 12 hours  lactated ringers. 1000 milliLiter(s) (100 mL/Hr) IV Continuous <Continuous>  metroNIDAZOLE  IVPB      metroNIDAZOLE  IVPB 500 milliGRAM(s) IV Intermittent every 8 hours    MEDICATIONS  (PRN):  acetaminophen   Tablet .. 650 milliGRAM(s) Oral every 6 hours PRN Moderate Pain (4 - 6)  metoclopramide 5 milliGRAM(s) Oral once PRN For N/V uncontrolled with Zofran  morphine  - Injectable 2 milliGRAM(s) IV Push every 6 hours PRN Severe Pain (7 - 10)  ondansetron Injectable 4 milliGRAM(s) IV Push every 6 hours PRN Nausea and/or Vomiting      __________________________________________________  REVIEW OF SYSTEMS:    CONSTITUTIONAL: No fever,   EYES: no acute visual disturbances  NECK: No pain or stiffness  RESPIRATORY: No cough; No shortness of breath  CARDIOVASCULAR: No chest pain, no palpitations  GASTROINTESTINAL: No pain. No nausea or vomiting; No diarrhea   NEUROLOGICAL: No headache or numbness, no tremors  MUSCULOSKELETAL: No joint pain, no muscle pain  GENITOURINARY: no dysuria, no frequency, no hesitancy  PSYCHIATRY: no depression , no anxiety  ALL OTHER  ROS negative        Vital Signs Last 24 Hrs  T(C): 37.1 (10 Haresh 2019 14:30), Max: 37.1 (09 Jun 2019 18:54)  T(F): 98.8 (10 Haresh 2019 14:30), Max: 98.8 (09 Jun 2019 20:54)  HR: 92 (10 Haresh 2019 14:30) (70 - 92)  BP: 128/86 (10 Haresh 2019 14:30) (125/70 - 133/76)  BP(mean): --  RR: 16 (10 Haresh 2019 14:30) (16 - 18)  SpO2: 98% (10 Haresh 2019 14:30) (98% - 100%)    ________________________________________________  PHYSICAL EXAM:  GENERAL: NAD  HEENT: Normocephalic;  conjunctivae and sclerae clear; moist mucous membranes;   NECK : supple  CHEST/LUNG: Clear to auscultation bilaterally with good air entry   HEART: S1 S2  regular; no murmurs, gallops or rubs  ABDOMEN: Soft, Nontender, Nondistended; Bowel sounds present  EXTREMITIES: no cyanosis; no edema; no calf tenderness  SKIN: warm and dry; no rash  NERVOUS SYSTEM:  Awake and alert; Oriented  to place, person and time ; no new deficits    _________________________________________________  LABS:                        12.6   12.52 )-----------( 322      ( 10 Haresh 2019 10:33 )             38.5     06-10    142  |  107  |  5<L>  ----------------------------<  112<H>  3.0<L>   |  27  |  0.69    Ca    8.5      10 Haresh 2019 10:33  Phos  2.3     06-10  Mg     1.9     06-10          CAPILLARY BLOOD GLUCOSE      POCT Blood Glucose.: 127 mg/dL (09 Jun 2019 18:51)    RADIOLOGY & ADDITIONAL TESTS:  CT Abdomen and Pelvis w/ IV Cont (06.05.19 @ 16:30) >    EXAM:  CT ABDOMEN AND PELVIS IC                            PROCEDURE DATE:  06/05/2019          INTERPRETATION:  CLINICAL INDICATION: 54 years  Female with diffuse abd   pain, vomiting, fever, diarrhea. Lumbosacral spine surgery one month ago.    COMPARISON: None.    PROCEDURE:   CT of the Abdomen and Pelvis was performed with intravenous contrast.   Intravenous contrast: 90 ml Omnipaque 350. 10 ml discarded.  Oral contrast: None.  Sagittal and coronal reformats were performed.    LIMITATION: Lack of enteric contrast limits evaluation of the bowel.   Motion artifact.    FINDINGS:    LOWER CHEST: Bilateral lower lobe dependent atelectasis. Mildly enlarged   right hilar lymph node measuring up to 7 mm in short axis.    LIVER: The liver is lowin attenuation secondary to hepatic steatosis.   There is no focal mass or intrahepatic biliary duct distention. The liver   is enlarged with the right lobe measuring 22.5 cm sagittal.  BILE DUCTS: Normal caliber.  GALLBLADDER: Within normal limits.  SPLEEN: Within normal limits.  PANCREAS: Within normal limits.  ADRENALS: Within normal limits.  KIDNEYS/URETERS: 6 mm right midpole renal hypodensity too small to   characterize with certainty. No other renal mass. No hydronephrosis or   calculus. The kidneys enhance symmetrically. The ureters are not   distended.    BLADDER: Within normal limits.  REPRODUCTIVE ORGANS: The uterus contains a 3 mm calcified intramural   myoma. Otherwise unremarkable.    BOWEL: Diffuse colonic wall thickening. No pericolonic inflammatory   stranding. The appendix is normal.  PERITONEUM: No ascites.  VESSELS:  Within normal limits.  RETROPERITONEUM: No lymphadenopathy.    ABDOMINAL WALL: Within normal limits.  BONES: Status post L3, L4 and L5 laminectomies with bilateral pedicle   screws and intervening rods. Anterior L5-S1 fixation. Soft tissue is   present within the operative bed. Cannot evaluate the spinal canal and   exit foramina due to limitations of CT and adjacent metallic hardware   artifact.     IMPRESSION:     Diffuse colonic wall thickening consistent with under distention or   colitis.    Status post L3, L4 and L5 laminectomies with bilateral pedicle screws and   intervening rods. Anterior L5-S1 fixation. Soft tissue is present within   theoperative bed consistent with postsurgical change. Cannot rule out   superimposed infection. Cannot evaluate the spinal canal and exit   foramina due to limitations of CT and adjacent metallic hardware   artifact. Recommend clinical evaluation and follow-up.    Hepatic steatosis and hepatomegaly.    Renal hypodensity too small to characterize with certainty.    Mildly enlarged right hilar lymph node.    < end of copied text >          Imaging Personally Reviewed:  YES/NO    Consultant(s) Notes Reviewed:   YES/ No    Care Discussed with Consultants :     Plan of care was discussed with patient and /or primary care giver; all questions and concerns were addressed and care was aligned with patient's wishes.
Patient is a 54y old  Female who presents with a chief complaint of intractable vomiting (06 Jun 2019 15:11)      INTERVAL HPI/OVERNIGHT EVENTS: no new complaints    MEDICATIONS  (STANDING):  ciprofloxacin   IVPB 400 milliGRAM(s) IV Intermittent every 12 hours  enoxaparin Injectable 40 milliGRAM(s) SubCutaneous daily  famotidine Injectable 20 milliGRAM(s) IV Push every 12 hours  lactated ringers. 1000 milliLiter(s) (100 mL/Hr) IV Continuous <Continuous>  metroNIDAZOLE  IVPB 500 milliGRAM(s) IV Intermittent every 8 hours    MEDICATIONS  (PRN):  acetaminophen   Tablet .. 650 milliGRAM(s) Oral every 6 hours PRN Moderate Pain (4 - 6)  morphine  - Injectable 2 milliGRAM(s) IV Push every 6 hours PRN Severe Pain (7 - 10)  ondansetron Injectable 4 milliGRAM(s) IV Push every 6 hours PRN Nausea and/or Vomiting      __________________________________________________  REVIEW OF SYSTEMS:    CONSTITUTIONAL: No fever,   EYES: no acute visual disturbances  NECK: No pain or stiffness  RESPIRATORY: No cough; No shortness of breath  CARDIOVASCULAR: No chest pain, no palpitations  GASTROINTESTINAL: No pain. No nausea or vomiting; No diarrhea   NEUROLOGICAL: No headache or numbness, no tremors  MUSCULOSKELETAL: No joint pain, no muscle pain  GENITOURINARY: no dysuria, no frequency, no hesitancy  PSYCHIATRY: no depression , no anxiety  ALL OTHER  ROS negative      Vital Signs Last 24 Hrs  T(C): 36.9 (07 Jun 2019 14:29), Max: 37.3 (06 Jun 2019 20:05)  T(F): 98.5 (07 Jun 2019 14:29), Max: 99.1 (06 Jun 2019 20:05)  HR: 73 (07 Jun 2019 14:29) (63 - 73)  BP: 113/73 (07 Jun 2019 14:29) (113/73 - 137/83)  BP(mean): --  RR: 18 (07 Jun 2019 14:29) (16 - 18)  SpO2: 99% (07 Jun 2019 14:29) (99% - 100%)    ________________________________________________  PHYSICAL EXAM:  GENERAL: NAD  HEENT: Normocephalic;  conjunctivae and sclerae clear; moist mucous membranes;   NECK : supple  CHEST/LUNG: Clear to auscultation bilaterally with good air entry   HEART: S1 S2  regular; no murmurs, gallops or rubs  ABDOMEN: Soft, Nontender, Nondistended; Bowel sounds present  EXTREMITIES: no cyanosis; no edema; no calf tenderness  SKIN: warm and dry; no rash  NERVOUS SYSTEM:  Awake and alert; Oriented  to place, person and time ; no new deficits    _________________________________________________  LABS:                                            12.0   10.52 )-----------( 308      ( 07 Jun 2019 06:50 )             36.0   06-07    142  |  109<H>  |  6<L>  ----------------------------<  96  3.2<L>   |  28  |  0.62    Ca    8.2<L>      07 Jun 2019 06:50  Phos  2.7     06-06  Mg     1.9     06-06    TPro  7.1  /  Alb  3.3<L>  /  TBili  0.5  /  DBili  x   /  AST  27  /  ALT  32  /  AlkPhos  95  06-07        RADIOLOGY & ADDITIONAL TESTS       EXAM:  CT ABDOMEN AND PELVIS IC                            PROCEDURE DATE:  06/05/2019          INTERPRETATION:  CLINICAL INDICATION: 54 years  Female with diffuse abd   pain, vomiting, fever, diarrhea. Lumbosacral spine surgery one month ago.    COMPARISON: None.    PROCEDURE:   CT of the Abdomen and Pelvis was performed with intravenous contrast.   Intravenous contrast: 90 ml Omnipaque 350. 10 ml discarded.  Oral contrast: None.  Sagittal and coronal reformats were performed.    LIMITATION: Lack of enteric contrast limits evaluation of the bowel.   Motion artifact.    FINDINGS:    LOWER CHEST: Bilateral lower lobe dependent atelectasis. Mildly enlarged   right hilar lymph node measuring up to 7 mm in short axis.    LIVER: The liver is lowin attenuation secondary to hepatic steatosis.   There is no focal mass or intrahepatic biliary duct distention. The liver   is enlarged with the right lobe measuring 22.5 cm sagittal.  BILE DUCTS: Normal caliber.  GALLBLADDER: Within normal limits.  SPLEEN: Within normal limits.  PANCREAS: Within normal limits.  ADRENALS: Within normal limits.  KIDNEYS/URETERS: 6 mm right midpole renal hypodensity too small to   characterize with certainty. No other renal mass. No hydronephrosis or   calculus. The kidneys enhance symmetrically. The ureters are not   distended.    BLADDER: Within normal limits.  REPRODUCTIVE ORGANS: The uterus contains a 3 mm calcified intramural   myoma. Otherwise unremarkable.    BOWEL: Diffuse colonic wall thickening. No pericolonic inflammatory   stranding. The appendix is normal.  PERITONEUM: No ascites.  VESSELS:  Within normal limits.  RETROPERITONEUM: No lymphadenopathy.    ABDOMINAL WALL: Within normal limits.  BONES: Status post L3, L4 and L5 laminectomies with bilateral pedicle   screws and intervening rods. Anterior L5-S1 fixation. Soft tissue is   present within the operative bed. Cannot evaluate the spinal canal and   exit foramina due to limitations of CT and adjacent metallic hardware   artifact.     IMPRESSION:     Diffuse colonic wall thickening consistent with under distention or   colitis.    Status post L3, L4 and L5 laminectomies with bilateral pedicle screws and   intervening rods. Anterior L5-S1 fixation. Soft tissue is present within   theoperative bed consistent with postsurgical change. Cannot rule out   superimposed infection. Cannot evaluate the spinal canal and exit   foramina due to limitations of CT and adjacent metallic hardware   artifact. Recommend clinical evaluation and follow-up.    Hepatic steatosis and hepatomegaly.    Renal hypodensity too small to characterize with certainty.    Mildly enlarged right hilar lymph node.             Imaging  Reviewed:  YES  Consultant(s) Notes Reviewed:   YES  Plan of care was discussed with patient and /or primary care giver; all questions and concerns were addressed
[   ] ICU                                          [   ] CCU                                      [  X ] Medical Floor    Patient is comfortable. No new complaints reported, No abdominal pain, N/V, hematemesis, hematochezia, melena, fever, chills, chest pain, SOB, cough or diarrhea reported.    VITALS  Vital Signs Last 24 Hrs  T(C): 36.8 (09 Jun 2019 05:15), Max: 36.9 (08 Jun 2019 14:24)  T(F): 98.3 (09 Jun 2019 05:15), Max: 98.5 (08 Jun 2019 20:13)  HR: 67 (09 Jun 2019 05:15) (67 - 87)  BP: 121/75 (09 Jun 2019 05:15) (121/75 - 137/97)   RR: 17 (09 Jun 2019 05:15) (17 - 17)  SpO2: 97% (09 Jun 2019 05:15) (97% - 99%)       MEDICATIONS  (STANDING):  ciprofloxacin   IVPB 400 milliGRAM(s) IV Intermittent every 12 hours  ciprofloxacin   IVPB      enoxaparin Injectable 40 milliGRAM(s) SubCutaneous daily  famotidine Injectable 20 milliGRAM(s) IV Push every 12 hours  lactated ringers. 1000 milliLiter(s) (100 mL/Hr) IV Continuous <Continuous>  metroNIDAZOLE  IVPB      metroNIDAZOLE  IVPB 500 milliGRAM(s) IV Intermittent every 8 hours    MEDICATIONS  (PRN):  acetaminophen   Tablet .. 650 milliGRAM(s) Oral every 6 hours PRN Moderate Pain (4 - 6)  metoclopramide 5 milliGRAM(s) Oral once PRN For N/V uncontrolled with Zofran  morphine  - Injectable 2 milliGRAM(s) IV Push every 6 hours PRN Severe Pain (7 - 10)  ondansetron Injectable 4 milliGRAM(s) IV Push every 6 hours PRN Nausea and/or Vomiting
[   ] ICU                                          [   ] CCU                                      [ X  ] Medical Floor    Patient is comfortable. No new complaints reported, No abdominal pain, N/V, hematemesis, hematochezia, melena, fever, chills, chest pain, SOB, cough or diarrhea reported.    VITALS  Vital Signs Last 24 Hrs  T(C): 36.9 (07 Jun 2019 14:29), Max: 37.3 (06 Jun 2019 20:05)  T(F): 98.5 (07 Jun 2019 14:29), Max: 99.1 (06 Jun 2019 20:05)  HR: 73 (07 Jun 2019 14:29) (63 - 73)  BP: 113/73 (07 Jun 2019 14:29) (113/73 - 137/83)   RR: 18 (07 Jun 2019 14:29) (16 - 18)  SpO2: 99% (07 Jun 2019 14:29) (99% - 100%)     MEDICATIONS  (STANDING):  ciprofloxacin   IVPB 400 milliGRAM(s) IV Intermittent every 12 hours  ciprofloxacin   IVPB      enoxaparin Injectable 40 milliGRAM(s) SubCutaneous daily  famotidine Injectable 20 milliGRAM(s) IV Push every 12 hours  lactated ringers. 1000 milliLiter(s) (100 mL/Hr) IV Continuous <Continuous>  metroNIDAZOLE  IVPB      metroNIDAZOLE  IVPB 500 milliGRAM(s) IV Intermittent every 8 hours    MEDICATIONS  (PRN):  acetaminophen   Tablet .. 650 milliGRAM(s) Oral every 6 hours PRN Moderate Pain (4 - 6)  morphine  - Injectable 2 milliGRAM(s) IV Push every 6 hours PRN Severe Pain (7 - 10)  ondansetron Injectable 4 milliGRAM(s) IV Push every 6 hours PRN Nausea and/or Vomiting                            12.0   10.52 )-----------( 308      ( 07 Jun 2019 06:50 )             36.0       06-07    142  |  109<H>  |  6<L>  ----------------------------<  96  3.2<L>   |  28  |  0.62    Ca    8.2<L>      07 Jun 2019 06:50  Phos  2.7     06-06  Mg     1.9     06-06    TPro  7.1  /  Alb  3.3<L>  /  TBili  0.5  /  DBili  x   /  AST  27  /  ALT  32  /  AlkPhos  95  06-07
54y Female is under our care with colitis. Pt reports that she is feeling better overall, however, pt still complains of nausea, vomited this morning. Pt is afebrile, leukocytosis is improving, stool and blood cultures are negative, C. Diff testing is negative, GI PCR Panel stool is pending. Treatment with Cipro and Flagyl IV continued. Pt denies having shortness of breath or chest pain.     MEDS:  ciprofloxacin   IVPB 400 milliGRAM(s) IV Intermittent every 12 hours  ciprofloxacin   IVPB      metroNIDAZOLE  IVPB      metroNIDAZOLE  IVPB 500 milliGRAM(s) IV Intermittent every 8 hours    Allergies    aspirin (Rash)      VITALS:  Vital Signs Last 24 Hrs  T(C): 36.7 (08 Jun 2019 05:03), Max: 37.3 (07 Jun 2019 17:09)  T(F): 98.1 (08 Jun 2019 05:03), Max: 99.1 (07 Jun 2019 17:09)  HR: 60 (08 Jun 2019 05:03) (60 - 73)  BP: 131/74 (08 Jun 2019 05:03) (113/73 - 131/74)  BP(mean): --  RR: 16 (08 Jun 2019 05:03) (16 - 18)  SpO2: 99% (08 Jun 2019 05:03) (99% - 100%)    LABS/DIAGNOSTIC TESTS:                          12.0   10.52 )-----------( 308      ( 07 Jun 2019 06:50 )             36.0     WBC trend  10.52  12.91  15.19    06-07    142  |  109<H>  |  6<L>  ----------------------------<  96  3.2<L>   |  28  |  0.62    Ca    8.2<L>      07 Jun 2019 06:50    TPro  7.1  /  Alb  3.3<L>  /  TBili  0.5  /  DBili  x   /  AST  27  /  ALT  32  /  AlkPhos  95  06-07      CULTURES:   .Stool  06-07 @ 10:16   No Protozoa seen by trichrome stain  No Helminths or Protozoa seen in formalin concentrate  performed by iodine stain  (routine O+P not evaluated for Microsporidia,  Cryptosporidia, Cyclospora, or Isospora.)  Note: One negative specimen does not rule  out the possibility of a parasitic infection.  --  --      .Blood  06-05 @ 22:28   No growth to date.  --  --    Clostridium difficile Toxin by PCR (06.07.19 @ 09:47)    Clostridium difficile Toxin by PCR: The results of this test should be interpreted with consideration of all  clinical and laboratory findings. This test determines the presence of  the C. difficile tcdB gene at a given time and is not intended to  identify antibiotic associated disease or C. difficile infection without  clinical context.  Successful treatment is based on the resolution of clinical symptoms.  This test should not be used as a "test of cure" because C. difficile DNA  will persist after successful treatment. Repeat testing will not be  permitted.    This test is performed on the BD MAX system using Real-Time PCR and  fluorogenic target-specific hybridization.    C Diff by PCR Result: NotAshokc
BETHANY MITCHELL  MR# [id number]  54yFemale        Patient is a 54y old  Female who presents with a chief complaint of intractable vomiting (08 Jun 2019 13:32)      INTERVAL HPI/OVERNIGHT EVENTS:  Patient seen and examined at bedside. No notations of chest pain, palpitation, SOB, orthopnea, nausea, vomiting or abdominal pain.    ALLERGIES  aspirin (Rash)      MEDICATIONS  acetaminophen   Tablet .. 650 milliGRAM(s) Oral every 6 hours PRN Moderate Pain (4 - 6)  ciprofloxacin   IVPB 400 milliGRAM(s) IV Intermittent every 12 hours  ciprofloxacin   IVPB      enoxaparin Injectable 40 milliGRAM(s) SubCutaneous daily  famotidine Injectable 20 milliGRAM(s) IV Push every 12 hours  lactated ringers. 1000 milliLiter(s) IV Continuous <Continuous>  metroNIDAZOLE  IVPB      metroNIDAZOLE  IVPB 500 milliGRAM(s) IV Intermittent every 8 hours  morphine  - Injectable 2 milliGRAM(s) IV Push every 6 hours PRN Severe Pain (7 - 10)  ondansetron Injectable 4 milliGRAM(s) IV Push every 6 hours PRN Nausea and/or Vomiting              REVIEW OF SYSTEMS:  CONSTITUTIONAL: No fever, weight loss, or fatigue  EYES: No eye pain, visual disturbances, or discharge  ENT:  No difficulty hearing, tinnitus, vertigo; No sinus or throat pain  NECK: No pain or stiffness  RESPIRATORY: No cough, wheezing, chills or hemoptysis; No Shortness of Breath  CARDIOVASCULAR: No chest pain, palpitations, passing out, dizziness, or leg swelling  GASTROINTESTINAL: No abdominal or epigastric pain. No nausea, vomiting, or hematemesis; No diarrhea or constipation. No melena or hematochezia.  GENITOURINARY: No dysuria, frequency, hematuria, or incontinence  NEUROLOGICAL: No headaches, memory loss, loss of strength, numbness, or tremors  SKIN: No itching, burning, rashes, or lesions   LYMPH Nodes: No enlarged glands  ENDOCRINE: No heat or cold intolerance; No hair loss  MUSCULOSKELETAL: No joint pain or swelling; No muscle, back, or extremity pain  PSYCHIATRIC: No depression, anxiety, mood swings, or difficulty sleeping  HEME/LYMPH: No easy bruising, or bleeding gums  ALLERGY AND IMMUNOLOGIC: No hives or eczema	    [ ] All others negative	  [ ] Unable to obtain      T(C): 36.9 (06-08-19 @ 20:13), Max: 36.9 (06-08-19 @ 14:24)  T(F): 98.5 (06-08-19 @ 20:13), Max: 98.5 (06-08-19 @ 20:13)  HR: 77 (06-08-19 @ 20:13) (60 - 87)  BP: 129/84 (06-08-19 @ 20:13) (129/84 - 137/97)  RR: 17 (06-08-19 @ 20:13) (16 - 17)  SpO2: 99% (06-08-19 @ 20:13) (99% - 99%)  Wt(kg): --    I&O's Summary        PHYSICAL EXAM:  A X O x  HEAD:  Atraumatic, Normocephalic  EYES: EOMI, PERRLA, conjunctiva and sclera clear  NECK: Supple, No JVD, Normal thyroid  Resp: CTAB, No crackles, wheezing,   CVS: Regular rate and rhythm; No discernable murmurs, rubs, or gallops  ABD: Soft, Nontender, Nondistended; Bowel sounds present  EXTREMITIES:  2+ Peripheral Pulses, No edema  LYMPH: No dicernable lymphadenopathy noted  GENERAL: NAD, well-groomed, well-developed      LABS:                        12.0   10.52 )-----------( 308      ( 07 Jun 2019 06:50 )             36.0     06-07    142  |  109<H>  |  6<L>  ----------------------------<  96  3.2<L>   |  28  |  0.62    Ca    8.2<L>      07 Jun 2019 06:50    TPro  7.1  /  Alb  3.3<L>  /  TBili  0.5  /  DBili  x   /  AST  27  /  ALT  32  /  AlkPhos  95  06-07        CAPILLARY BLOOD GLUCOSE          Troponins:  ProBNP:  Lipid Profile:   HgA1c:  TSH:           RADIOLOGY & ADDITIONAL TESTS:    Imaging Personally Reviewed:  [ ] YES  [ ] NO      Consultant(s) Notes Reviewed:  [x ] YES  [ ] NO    Care Discussed with Consultants/Other Providers [ x] YES  [ ] NO          PAST MEDICAL & SURGICAL HISTORY:  No pertinent past medical history  History of back surgery: s/p work related accident        Noninfectious gastroenteritis  No pertinent family history in first degree relatives  Handoff  MEWS Score  No pertinent past medical history  Kidney stones  Kidney stones  No pertinent past medical history  Colitis  Colitis  Need for prophylactic measure  Lactic acidosis  Intractable vomiting  History of back surgery  W/FEVER  90+  Intractable vomiting
BETHANY MITCHELL  MR# [id number]  54yFemale        Patient is a 54y old  Female who presents with a chief complaint of intractable vomiting (09 Jun 2019 13:32)      INTERVAL HPI/OVERNIGHT EVENTS:  Patient seen and examined at bedside. No notations of chest pain, palpitation, SOB, orthopnea, nausea, vomiting or abdominal pain.    ALLERGIES  aspirin (Rash)      MEDICATIONS  acetaminophen   Tablet .. 650 milliGRAM(s) Oral every 6 hours PRN Moderate Pain (4 - 6)  ciprofloxacin   IVPB 400 milliGRAM(s) IV Intermittent every 12 hours  ciprofloxacin   IVPB      enoxaparin Injectable 40 milliGRAM(s) SubCutaneous daily  famotidine Injectable 20 milliGRAM(s) IV Push every 12 hours  lactated ringers. 1000 milliLiter(s) IV Continuous <Continuous>  metoclopramide 5 milliGRAM(s) Oral once PRN For N/V uncontrolled with Zofran  metroNIDAZOLE  IVPB      metroNIDAZOLE  IVPB 500 milliGRAM(s) IV Intermittent every 8 hours  morphine  - Injectable 2 milliGRAM(s) IV Push every 6 hours PRN Severe Pain (7 - 10)  ondansetron Injectable 4 milliGRAM(s) IV Push every 6 hours PRN Nausea and/or Vomiting              REVIEW OF SYSTEMS:  CONSTITUTIONAL: No fever, weight loss, or fatigue  EYES: No eye pain, visual disturbances, or discharge  ENT:  No difficulty hearing, tinnitus, vertigo; No sinus or throat pain  NECK: No pain or stiffness  RESPIRATORY: No cough, wheezing, chills or hemoptysis; No Shortness of Breath  CARDIOVASCULAR: No chest pain, palpitations, passing out, dizziness, or leg swelling  GASTROINTESTINAL: No abdominal or epigastric pain. No nausea, vomiting, or hematemesis; No diarrhea or constipation. No melena or hematochezia.  GENITOURINARY: No dysuria, frequency, hematuria, or incontinence  NEUROLOGICAL: No headaches, memory loss, loss of strength, numbness, or tremors  SKIN: No itching, burning, rashes, or lesions   LYMPH Nodes: No enlarged glands  ENDOCRINE: No heat or cold intolerance; No hair loss  MUSCULOSKELETAL: No joint pain or swelling; No muscle, back, or extremity pain  PSYCHIATRIC: No depression, anxiety, mood swings, or difficulty sleeping  HEME/LYMPH: No easy bruising, or bleeding gums  ALLERGY AND IMMUNOLOGIC: No hives or eczema	    [ ] All others negative	  [ ] Unable to obtain      T(C): 37.3 (06-09-19 @ 14:11), Max: 37.3 (06-09-19 @ 14:11)  T(F): 99.1 (06-09-19 @ 14:11), Max: 99.1 (06-09-19 @ 14:11)  HR: 73 (06-09-19 @ 14:11) (67 - 77)  BP: 136/78 (06-09-19 @ 14:11) (121/75 - 136/78)  RR: 17 (06-09-19 @ 14:11) (17 - 17)  SpO2: 100% (06-09-19 @ 14:11) (97% - 100%)  Wt(kg): --    I&O's Summary        PHYSICAL EXAM:  A X O x  HEAD:  Atraumatic, Normocephalic  EYES: EOMI, PERRLA, conjunctiva and sclera clear  NECK: Supple, No JVD, Normal thyroid  Resp: CTAB, No crackles, wheezing,   CVS: Regular rate and rhythm; No discernable murmurs, rubs, or gallops  ABD: Soft, Nontender, Nondistended; Bowel sounds present  EXTREMITIES:  2+ Peripheral Pulses, No edema  LYMPH: No dicernable lymphadenopathy noted  GENERAL: NAD, well-groomed, well-developed      LABS:              CAPILLARY BLOOD GLUCOSE          Troponins:  ProBNP:  Lipid Profile:   HgA1c:  TSH:           RADIOLOGY & ADDITIONAL TESTS:    Imaging Personally Reviewed:  [ ] YES  [ ] NO      Consultant(s) Notes Reviewed:  [x ] YES  [ ] NO    Care Discussed with Consultants/Other Providers [ x] YES  [ ] NO          PAST MEDICAL & SURGICAL HISTORY:  No pertinent past medical history  History of back surgery: s/p work related accident        Noninfectious gastroenteritis  No pertinent family history in first degree relatives  Handoff  MEWS Score  No pertinent past medical history  Kidney stones  Kidney stones  No pertinent past medical history  Colitis  Colitis  Need for prophylactic measure  Lactic acidosis  Intractable vomiting  History of back surgery  W/FEVER  90+  Intractable vomiting
NP Note discussed with  Primary Attending    Patient is a 54y old  Female who presents with a chief complaint of intractable vomiting (07 Jun 2019 17:04)      MEDICATIONS  (STANDING):  ciprofloxacin   IVPB 400 milliGRAM(s) IV Intermittent every 12 hours  ciprofloxacin   IVPB      enoxaparin Injectable 40 milliGRAM(s) SubCutaneous daily  famotidine Injectable 20 milliGRAM(s) IV Push every 12 hours  lactated ringers. 1000 milliLiter(s) (100 mL/Hr) IV Continuous <Continuous>  metroNIDAZOLE  IVPB      metroNIDAZOLE  IVPB 500 milliGRAM(s) IV Intermittent every 8 hours    MEDICATIONS  (PRN):  acetaminophen   Tablet .. 650 milliGRAM(s) Oral every 6 hours PRN Moderate Pain (4 - 6)  morphine  - Injectable 2 milliGRAM(s) IV Push every 6 hours PRN Severe Pain (7 - 10)  ondansetron Injectable 4 milliGRAM(s) IV Push every 6 hours PRN Nausea and/or Vomiting      __________________________________________________  REVIEW OF SYSTEMS:    CONSTITUTIONAL: No fever,   EYES: no acute visual disturbances  NECK: No pain or stiffness  RESPIRATORY: No cough; No shortness of breath  CARDIOVASCULAR: No chest pain, no palpitations  GASTROINTESTINAL: No pain. No nausea or vomiting; No diarrhea   NEUROLOGICAL: No headache or numbness, no tremors  MUSCULOSKELETAL: No joint pain, no muscle pain  GENITOURINARY: no dysuria, no frequency, no hesitancy  PSYCHIATRY: no depression , no anxiety  ALL OTHER  ROS negative        Vital Signs Last 24 Hrs  T(C): 37.3 (07 Jun 2019 17:09), Max: 37.3 (06 Jun 2019 20:05)  T(F): 99.1 (07 Jun 2019 17:09), Max: 99.1 (06 Jun 2019 20:05)  HR: 73 (07 Jun 2019 14:29) (63 - 73)  BP: 113/73 (07 Jun 2019 14:29) (113/73 - 137/83)  BP(mean): --  RR: 18 (07 Jun 2019 14:29) (16 - 18)  SpO2: 99% (07 Jun 2019 14:29) (99% - 100%)    ________________________________________________  PHYSICAL EXAM:  GENERAL: NAD  HEENT: Normocephalic;  conjunctivae and sclerae clear; moist mucous membranes;   NECK : supple  CHEST/LUNG: Clear to auscultation bilaterally with good air entry   HEART: S1 S2  regular; no murmurs, gallops or rubs  ABDOMEN: Soft, Nontender, Nondistended; Bowel sounds present  EXTREMITIES: no cyanosis; no edema; no calf tenderness  SKIN: warm and dry; no rash  NERVOUS SYSTEM:  Awake and alert; Oriented  to place, person and time ; no new deficits    _________________________________________________  LABS:                        12.0   10.52 )-----------( 308      ( 07 Jun 2019 06:50 )             36.0     06-07    142  |  109<H>  |  6<L>  ----------------------------<  96  3.2<L>   |  28  |  0.62    Ca    8.2<L>      07 Jun 2019 06:50  Phos  2.7     06-06  Mg     1.9     06-06    TPro  7.1  /  Alb  3.3<L>  /  TBili  0.5  /  DBili  x   /  AST  27  /  ALT  32  /  AlkPhos  95  06-07
NP Note discussed with  Primary Attending    Patient is a 54y old  Female who presents with a chief complaint of intractable vomiting (2019 15:11)      INTERVAL HPI/OVERNIGHT EVENTS: no new complaints    MEDICATIONS  (STANDING):  ciprofloxacin   IVPB 400 milliGRAM(s) IV Intermittent every 12 hours  enoxaparin Injectable 40 milliGRAM(s) SubCutaneous daily  famotidine Injectable 20 milliGRAM(s) IV Push every 12 hours  lactated ringers. 1000 milliLiter(s) (100 mL/Hr) IV Continuous <Continuous>  metroNIDAZOLE  IVPB 500 milliGRAM(s) IV Intermittent every 8 hours    MEDICATIONS  (PRN):  acetaminophen   Tablet .. 650 milliGRAM(s) Oral every 6 hours PRN Moderate Pain (4 - 6)  morphine  - Injectable 2 milliGRAM(s) IV Push every 6 hours PRN Severe Pain (7 - 10)  ondansetron Injectable 4 milliGRAM(s) IV Push every 6 hours PRN Nausea and/or Vomiting      __________________________________________________  REVIEW OF SYSTEMS:    CONSTITUTIONAL: No fever,   EYES: no acute visual disturbances  NECK: No pain or stiffness  RESPIRATORY: No cough; No shortness of breath  CARDIOVASCULAR: No chest pain, no palpitations  GASTROINTESTINAL: No pain. No nausea or vomiting; No diarrhea   NEUROLOGICAL: No headache or numbness, no tremors  MUSCULOSKELETAL: No joint pain, no muscle pain  GENITOURINARY: no dysuria, no frequency, no hesitancy  PSYCHIATRY: no depression , no anxiety  ALL OTHER  ROS negative        Vital Signs Last 24 Hrs  T(C): 37.1 (2019 14:12), Max: 37.4 (2019 16:05)  T(F): 98.8 (2019 14:12), Max: 99.3 (2019 16:05)  HR: 68 (2019 14:12) (67 - 91)  BP: 111/62 (2019 14:12) (108/56 - 137/96)  BP(mean): 69 (2019 19:00) (69 - 69)  RR: 16 (2019 14:12) (16 - 24)  SpO2: 100% (2019 14:12) (98% - 100%)    ________________________________________________  PHYSICAL EXAM:  GENERAL: NAD  HEENT: Normocephalic;  conjunctivae and sclerae clear; moist mucous membranes;   NECK : supple  CHEST/LUNG: Clear to auscultation bilaterally with good air entry   HEART: S1 S2  regular; no murmurs, gallops or rubs  ABDOMEN: Soft, Nontender, Nondistended; Bowel sounds present  EXTREMITIES: no cyanosis; no edema; no calf tenderness  SKIN: warm and dry; no rash  NERVOUS SYSTEM:  Awake and alert; Oriented  to place, person and time ; no new deficits    _________________________________________________  LABS:                        12.0   12.91 )-----------( 317      ( 2019 07:29 )             37.0     06-06    145  |  112<H>  |  10  ----------------------------<  94  3.1<L>   |  27  |  0.69    Ca    8.2<L>      2019 07:29  Phos  2.7     06-06  Mg     1.9     06-06    TPro  7.1  /  Alb  3.2<L>  /  TBili  0.5  /  DBili  x   /  AST  17  /  ALT  23  /  AlkPhos  94  06-06    PT/INR - ( 2019 13:45 )   PT: 11.2 sec;   INR: 1.01 ratio         PTT - ( 2019 13:45 )  PTT:31.7 sec  Urinalysis Basic - ( 2019 13:45 )    Color: Yellow / Appearance: Clear / S.010 / pH: x  Gluc: x / Ketone: Moderate  / Bili: Negative / Urobili: Negative   Blood: x / Protein: Negative / Nitrite: Negative   Leuk Esterase: Negative / RBC: x / WBC x   Sq Epi: x / Non Sq Epi: x / Bacteria: x      RADIOLOGY & ADDITIONAL TESTS       EXAM:  CT ABDOMEN AND PELVIS IC                            PROCEDURE DATE:  2019          INTERPRETATION:  CLINICAL INDICATION: 54 years  Female with diffuse abd   pain, vomiting, fever, diarrhea. Lumbosacral spine surgery one month ago.    COMPARISON: None.    PROCEDURE:   CT of the Abdomen and Pelvis was performed with intravenous contrast.   Intravenous contrast: 90 ml Omnipaque 350. 10 ml discarded.  Oral contrast: None.  Sagittal and coronal reformats were performed.    LIMITATION: Lack of enteric contrast limits evaluation of the bowel.   Motion artifact.    FINDINGS:    LOWER CHEST: Bilateral lower lobe dependent atelectasis. Mildly enlarged   right hilar lymph node measuring up to 7 mm in short axis.    LIVER: The liver is lowin attenuation secondary to hepatic steatosis.   There is no focal mass or intrahepatic biliary duct distention. The liver   is enlarged with the right lobe measuring 22.5 cm sagittal.  BILE DUCTS: Normal caliber.  GALLBLADDER: Within normal limits.  SPLEEN: Within normal limits.  PANCREAS: Within normal limits.  ADRENALS: Within normal limits.  KIDNEYS/URETERS: 6 mm right midpole renal hypodensity too small to   characterize with certainty. No other renal mass. No hydronephrosis or   calculus. The kidneys enhance symmetrically. The ureters are not   distended.    BLADDER: Within normal limits.  REPRODUCTIVE ORGANS: The uterus contains a 3 mm calcified intramural   myoma. Otherwise unremarkable.    BOWEL: Diffuse colonic wall thickening. No pericolonic inflammatory   stranding. The appendix is normal.  PERITONEUM: No ascites.  VESSELS:  Within normal limits.  RETROPERITONEUM: No lymphadenopathy.    ABDOMINAL WALL: Within normal limits.  BONES: Status post L3, L4 and L5 laminectomies with bilateral pedicle   screws and intervening rods. Anterior L5-S1 fixation. Soft tissue is   present within the operative bed. Cannot evaluate the spinal canal and   exit foramina due to limitations of CT and adjacent metallic hardware   artifact.     IMPRESSION:     Diffuse colonic wall thickening consistent with under distention or   colitis.    Status post L3, L4 and L5 laminectomies with bilateral pedicle screws and   intervening rods. Anterior L5-S1 fixation. Soft tissue is present within   theoperative bed consistent with postsurgical change. Cannot rule out   superimposed infection. Cannot evaluate the spinal canal and exit   foramina due to limitations of CT and adjacent metallic hardware   artifact. Recommend clinical evaluation and follow-up.    Hepatic steatosis and hepatomegaly.    Renal hypodensity too small to characterize with certainty.    Mildly enlarged right hilar lymph node.             Imaging  Reviewed:  YES  Consultant(s) Notes Reviewed:   YES  Plan of care was discussed with patient and /or primary care giver; all questions and concerns were addressed
[   ] ICU                                          [   ] CCU                                      [ X  ] Medical Floor    Patient is comfortable. No new complaints reported, No abdominal pain, N/V, hematemesis, hematochezia, melena, fever, chills, chest pain, SOB, cough or diarrhea reported.    VITALS  Vital Signs Last 24 Hrs  T(C): 37.1 (10 Haresh 2019 14:30), Max: 37.1 (09 Jun 2019 18:54)  T(F): 98.8 (10 Haresh 2019 14:30), Max: 98.8 (09 Jun 2019 20:54)  HR: 92 (10 Haresh 2019 14:30) (70 - 92)  BP: 128/86 (10 Haresh 2019 14:30) (125/70 - 133/76)   RR: 16 (10 Haresh 2019 14:30) (16 - 18)  SpO2: 98% (10 Haresh 2019 14:30) (98% - 100%)       MEDICATIONS  (STANDING):  ciprofloxacin   IVPB 400 milliGRAM(s) IV Intermittent every 12 hours  ciprofloxacin   IVPB      enoxaparin Injectable 40 milliGRAM(s) SubCutaneous daily  famotidine Injectable 20 milliGRAM(s) IV Push every 12 hours  lactated ringers. 1000 milliLiter(s) (100 mL/Hr) IV Continuous <Continuous>  metroNIDAZOLE  IVPB      metroNIDAZOLE  IVPB 500 milliGRAM(s) IV Intermittent every 8 hours  potassium chloride    Tablet ER 40 milliEquivalent(s) Oral once  potassium phosphate IVPB 30 milliMole(s) IV Intermittent once    MEDICATIONS  (PRN):  acetaminophen   Tablet .. 650 milliGRAM(s) Oral every 6 hours PRN Moderate Pain (4 - 6)  metoclopramide 5 milliGRAM(s) Oral once PRN For N/V uncontrolled with Zofran  morphine  - Injectable 2 milliGRAM(s) IV Push every 6 hours PRN Severe Pain (7 - 10)  ondansetron Injectable 4 milliGRAM(s) IV Push every 6 hours PRN Nausea and/or Vomiting                            12.6   12.52 )-----------( 322      ( 10 Haresh 2019 10:33 )             38.5       06-10    142  |  107  |  5<L>  ----------------------------<  112<H>  3.0<L>   |  27  |  0.69    Ca    8.5      10 Haresh 2019 10:33  Phos  2.3     06-10  Mg     1.9     06-10

## 2019-06-10 NOTE — PROGRESS NOTE ADULT - ASSESSMENT
1. Pancolitis  2. Abdominal pain improved    Suggestions:    1. Monitor electrolytes  2. Continue antibiotics as per ID  3. Avoid NSAID  4. Advance diet as tolerated  5. DVT prophylaxis  6. Follow up stool studies

## 2019-06-10 NOTE — PROGRESS NOTE ADULT - PROBLEM SELECTOR PLAN 3
Continue Lovenox 40 mg Sq daily
Hypokalemia 3.0 and Hypophosphatemia 2.3 likely due to vomiting  -K+Phos 30 millimoles and Potassium 40meq x 1  -f/u am BMP
1.  Continue Lovenox 40 mg Sq daily.
Continue Lovenox 40 mg Sq daily

## 2019-06-10 NOTE — PROGRESS NOTE ADULT - REASON FOR ADMISSION
intractable vomiting

## 2019-06-11 ENCOUNTER — TRANSCRIPTION ENCOUNTER (OUTPATIENT)
Age: 54
End: 2019-06-11

## 2019-06-11 VITALS
DIASTOLIC BLOOD PRESSURE: 77 MMHG | RESPIRATION RATE: 18 BRPM | TEMPERATURE: 99 F | HEART RATE: 91 BPM | OXYGEN SATURATION: 100 % | SYSTOLIC BLOOD PRESSURE: 130 MMHG

## 2019-06-11 LAB
ANION GAP SERPL CALC-SCNC: 10 MMOL/L — SIGNIFICANT CHANGE UP (ref 5–17)
BUN SERPL-MCNC: 4 MG/DL — LOW (ref 7–18)
CALCIUM SERPL-MCNC: 8.2 MG/DL — LOW (ref 8.4–10.5)
CHLORIDE SERPL-SCNC: 110 MMOL/L — HIGH (ref 96–108)
CO2 SERPL-SCNC: 25 MMOL/L — SIGNIFICANT CHANGE UP (ref 22–31)
CREAT SERPL-MCNC: 0.63 MG/DL — SIGNIFICANT CHANGE UP (ref 0.5–1.3)
GLUCOSE SERPL-MCNC: 89 MG/DL — SIGNIFICANT CHANGE UP (ref 70–99)
HCT VFR BLD CALC: 39.5 % — SIGNIFICANT CHANGE UP (ref 34.5–45)
HGB BLD-MCNC: 12.9 G/DL — SIGNIFICANT CHANGE UP (ref 11.5–15.5)
MAGNESIUM SERPL-MCNC: 2.2 MG/DL — SIGNIFICANT CHANGE UP (ref 1.6–2.6)
MCHC RBC-ENTMCNC: 29.1 PG — SIGNIFICANT CHANGE UP (ref 27–34)
MCHC RBC-ENTMCNC: 32.7 GM/DL — SIGNIFICANT CHANGE UP (ref 32–36)
MCV RBC AUTO: 89 FL — SIGNIFICANT CHANGE UP (ref 80–100)
NRBC # BLD: 0 /100 WBCS — SIGNIFICANT CHANGE UP (ref 0–0)
PHOSPHATE SERPL-MCNC: 2.8 MG/DL — SIGNIFICANT CHANGE UP (ref 2.5–4.5)
PLATELET # BLD AUTO: 347 K/UL — SIGNIFICANT CHANGE UP (ref 150–400)
POTASSIUM SERPL-MCNC: 3.6 MMOL/L — SIGNIFICANT CHANGE UP (ref 3.5–5.3)
POTASSIUM SERPL-SCNC: 3.6 MMOL/L — SIGNIFICANT CHANGE UP (ref 3.5–5.3)
RBC # BLD: 4.44 M/UL — SIGNIFICANT CHANGE UP (ref 3.8–5.2)
RBC # FLD: 15.5 % — HIGH (ref 10.3–14.5)
SODIUM SERPL-SCNC: 145 MMOL/L — SIGNIFICANT CHANGE UP (ref 135–145)
WBC # BLD: 10.79 K/UL — HIGH (ref 3.8–10.5)
WBC # FLD AUTO: 10.79 K/UL — HIGH (ref 3.8–10.5)

## 2019-06-11 PROCEDURE — 83735 ASSAY OF MAGNESIUM: CPT

## 2019-06-11 PROCEDURE — 80061 LIPID PANEL: CPT

## 2019-06-11 PROCEDURE — 83605 ASSAY OF LACTIC ACID: CPT

## 2019-06-11 PROCEDURE — 80053 COMPREHEN METABOLIC PANEL: CPT

## 2019-06-11 PROCEDURE — 96374 THER/PROPH/DIAG INJ IV PUSH: CPT

## 2019-06-11 PROCEDURE — 81003 URINALYSIS AUTO W/O SCOPE: CPT

## 2019-06-11 PROCEDURE — 36415 COLL VENOUS BLD VENIPUNCTURE: CPT

## 2019-06-11 PROCEDURE — 82746 ASSAY OF FOLIC ACID SERUM: CPT

## 2019-06-11 PROCEDURE — 85027 COMPLETE CBC AUTOMATED: CPT

## 2019-06-11 PROCEDURE — 82962 GLUCOSE BLOOD TEST: CPT

## 2019-06-11 PROCEDURE — 84702 CHORIONIC GONADOTROPIN TEST: CPT

## 2019-06-11 PROCEDURE — 82607 VITAMIN B-12: CPT

## 2019-06-11 PROCEDURE — 84484 ASSAY OF TROPONIN QUANT: CPT

## 2019-06-11 PROCEDURE — 87507 IADNA-DNA/RNA PROBE TQ 12-25: CPT

## 2019-06-11 PROCEDURE — 85610 PROTHROMBIN TIME: CPT

## 2019-06-11 PROCEDURE — 87177 OVA AND PARASITES SMEARS: CPT

## 2019-06-11 PROCEDURE — 87040 BLOOD CULTURE FOR BACTERIA: CPT

## 2019-06-11 PROCEDURE — 85730 THROMBOPLASTIN TIME PARTIAL: CPT

## 2019-06-11 PROCEDURE — 74177 CT ABD & PELVIS W/CONTRAST: CPT

## 2019-06-11 PROCEDURE — 82306 VITAMIN D 25 HYDROXY: CPT

## 2019-06-11 PROCEDURE — 80048 BASIC METABOLIC PNL TOTAL CA: CPT

## 2019-06-11 PROCEDURE — 83690 ASSAY OF LIPASE: CPT

## 2019-06-11 PROCEDURE — 83036 HEMOGLOBIN GLYCOSYLATED A1C: CPT

## 2019-06-11 PROCEDURE — 87493 C DIFF AMPLIFIED PROBE: CPT

## 2019-06-11 PROCEDURE — 84100 ASSAY OF PHOSPHORUS: CPT

## 2019-06-11 PROCEDURE — 96375 TX/PRO/DX INJ NEW DRUG ADDON: CPT

## 2019-06-11 PROCEDURE — 99285 EMERGENCY DEPT VISIT HI MDM: CPT | Mod: 25

## 2019-06-11 PROCEDURE — 84443 ASSAY THYROID STIM HORMONE: CPT

## 2019-06-11 RX ORDER — ONDANSETRON 8 MG/1
1 TABLET, FILM COATED ORAL
Qty: 28 | Refills: 0
Start: 2019-06-11 | End: 2019-06-17

## 2019-06-11 RX ORDER — FAMOTIDINE 10 MG/ML
1 INJECTION INTRAVENOUS
Qty: 30 | Refills: 0
Start: 2019-06-11 | End: 2019-07-10

## 2019-06-11 RX ORDER — METRONIDAZOLE 500 MG
1 TABLET ORAL
Qty: 9 | Refills: 0
Start: 2019-06-11 | End: 2019-06-13

## 2019-06-11 RX ORDER — MOXIFLOXACIN HYDROCHLORIDE TABLETS, 400 MG 400 MG/1
1 TABLET, FILM COATED ORAL
Qty: 6 | Refills: 0
Start: 2019-06-11 | End: 2019-06-13

## 2019-06-11 RX ADMIN — Medication 200 MILLIGRAM(S): at 05:40

## 2019-06-11 RX ADMIN — FAMOTIDINE 20 MILLIGRAM(S): 10 INJECTION INTRAVENOUS at 05:40

## 2019-06-11 RX ADMIN — Medication 100 MILLIGRAM(S): at 05:40

## 2019-06-11 RX ADMIN — ONDANSETRON 4 MILLIGRAM(S): 8 TABLET, FILM COATED ORAL at 08:41

## 2019-06-11 NOTE — DISCHARGE NOTE NURSING/CASE MANAGEMENT/SOCIAL WORK - NSDCDPATPORTLINK_GEN_ALL_CORE
You can access the [facility] Patient Portal, offered by [facility], by registering with the following website: [email or web address]

## 2019-09-03 NOTE — ED ADULT NURSE NOTE - CADM POA URETHRAL CATHETER
No heavy lifting/No sports/gym/No excercise/2 weeks/Weight bearing as tolerated/Nothing per vagina/No tub baths/No douching/No tampons/No intercourse
No

## 2020-09-01 ENCOUNTER — APPOINTMENT (OUTPATIENT)
Dept: PHYSICAL MEDICINE AND REHAB | Facility: CLINIC | Age: 55
End: 2020-09-01

## 2023-05-03 NOTE — DIETITIAN INITIAL EVALUATION ADULT. - NS AS NUTRI DX ORAL SUPORT IN2
Left voice mail for patient to call our office back to schedule a Fibroscan. Referred by Nora Wesley PA-C. Deferred order for 10 days and sent patient 1st letter.   
Inadequate oral intake

## 2023-09-28 NOTE — DIETITIAN INITIAL EVALUATION ADULT. - PROBLEM SELECTOR PLAN 1
[facility]   Physical Therapy Treatment Note      Name: Amaya Donislan  Clinic Number: [id number]    Therapy Diagnosis:   Encounter Diagnosis   Name Primary?    Chronic bilateral low back pain without sciatica Yes     Physician: Andrea Pham MD    Visit Date: 9/28/2023    Physician Orders: PT Eval and Treat  Medical Diagnosis from Referral: Injury of low back  Evaluation Date: 9/18/2023  Authorization Period Expiration: 12/31/2023  Plan of Care Expiration: 11/17/2023                          Progress Update: 10/18/2023            Visit # / Visits authorized: 1 / 1          FOTO  Number Date Score%   1 09/18/2023 52   2     3     4        PRECAUTIONS: Standard Precautions and Diabetes:        Time In: 0841  Time Out: 0834  Total Appointment Time (timed & untimed codes): 53 minutes    PTA Visit #: 0/5     Subjective     Patient reports: Pain and stiffness when entering therapy with relief on departure  She was compliant with home exercise program.  Response to previous treatment: decreased pain with dry needling  Functional change: none    Pain: 5/10  Location: bilateral low back and up to thoracic spine    Objective      Objective Measures updated at progress report unless specified.     Treatment     Amaya received the treatments listed below:      therapeutic exercises to develop strength, posture, and core stabilization for 12 minutes including:   Activity   Details    Passive hamstring stretch x     Active QL stretch x     Manual hamstring stretch x     Knee to chest stretch x        manual therapy techniques: Joint mobilizations and Soft tissue Mobilization were applied to the: low back for 10 minutes, including:   Activity   Details    Lumbar and thoracic AP springing x     STM to lumbar and thoracic spine x Manually and with massage gun             neuromuscular re-education activities to improve: Kinesthetic, Sense, Proprioception, and Posture for 23 minutes. The following  activities were included:   Activity   Details    Bridges  3x10    Posterior pelvic tilt x 3x10    Prone hip extension x 3x10    Prone pushup x     Open book      MedX extension x 40# x20    MedX rotation x 20# x20 bilaterally     Therapeutic activities to improve functional performance for 8  minutes, including:       Activity   Details    One arm KB carry x Bilaterally 10# 160 feet                     Patient Education and Home Exercises       Education provided:   - Home program    Written Home Exercises Provided: Patient instructed to cont prior HEP. Exercises were reviewed and Amaya was able to demonstrate them prior to the end of the session.  Amaya demonstrated good  understanding of the education provided. See Electronic Medical Record under Patient Instructions for exercises provided during therapy sessions    Assessment     The patient reports benefit with physical therapy.  She has complaints of thoracic and lumbar area pain.  She receives instruction in core stabilization and strengthening.      Amaya Is progressing well towards her goals.   Patient prognosis is Good.     Patient will continue to benefit from skilled outpatient physical therapy to address the deficits listed in the problem list box on initial evaluation, provide pt/family education and to maximize pt's level of independence in the home and community environment.     Patient's spiritual, cultural and educational needs considered and pt agreeable to plan of care and goals.     Anticipated barriers to physical therapy: None    SHORT TERM GOALS:  4 weeks  Progress Date Met   Recent signs and systems trend is improving in order to progress towards Long term goals.  [] Met  [] Not Met  [] Progressing     Patient will be independent with Home Exercise Program  in order to further progress and return to maximal function. [] Met  [] Not Met  [] Progressing     Pain rating at Worst: 5 /10 in order to progress towards increased independence with  activity. [] Met  [] Not Met  [] Progressing     Patient will be able to correct postural deviations in sitting and standing, to decrease pain and promote postural awareness for injury prevention.  [] Met  [] Not Met  [] Progressing     Patient will improve functional outcome (FOTO) score: by 5% to increase self-worth & perceived functional ability towards long term goals [] Met  [] Not Met  [] Progressing        LONG TERM GOALS: 8 weeks  Progress Date Met   Patient will return to normal activites of daily living, recreational, and work related activities with less pain and limitation.  [] Met  [] Not Met  [] Progressing     Patient will improve range of motion  to stated goals in order to return to maximal functional potential.  [] Met  [] Not Met  [] Progressing     Patient will improve Strength to stated goals of appropriate musculature in order to improve functional independence.  [] Met  [] Not Met  [] Progressing     Pain Rating at Best: 1/10 to improve Quality of Life.  [] Met  [] Not Met  [] Progressing     Patient will meet predicted functional outcome (FOTO) score: 56% to increase self-worth & perceived functional ability. [] Met  [] Not Met  [] Progressing     Patient will have met/partially met personal goal of: improve pain and function  [] Met  [] Not Met  [] Progressing              PLAN   Plan of care Certification: 9/18/2023 to 11/17/2023     Outpatient Physical Therapy 2 times weekly for 8 weeks to include any combination of the following interventions: virtual visits, dry needling, modalities, Manual Therapy, Moist Heat/ Ice, Neuromuscular Re-ed, Patient Education, Self Care, Therapeutic Exercise, Functional Training, and Therapeutic Activites     Smooth Mclean, PT      WBC count 15.19  CT abdomen suggestive of colitis  sx most likely 2/2 to infectious colitis  lipase wnl  c/w cipro  c/w flagyl  NPO for now - diet to be advanced as tolerated  c/w IV fluids  zofran prn  pepcid 20mg iv bid while npo  GI consulted - Dr. Costello  ID consulted - Dr. Sewell

## 2024-02-22 NOTE — ED PROVIDER NOTE - CROS ED ROS STATEMENT
[TextEntry] : Finish amoxicillin as prescribed, take entirety of course even if child is feeling better or fever free. Continue Acetaminophen and ibuprofen as needed for pain/fever/discomfort.  Rest and keep hydrated.  Return to school/activities once fever free for minimum of 24 hours off of antipyretics or/and on antibiotics for at least 24 hours.  Once on antibiotics for 24 hours, replace toothbrush, toothpaste, wash sheets/pillow cases, and wash pets fur if application.  Can use salt water gargles if applicable to age and situation.   If symptoms worsen or persist, return to office.
all other ROS negative except as per HPI